# Patient Record
Sex: FEMALE | Race: WHITE | NOT HISPANIC OR LATINO | Employment: FULL TIME | URBAN - METROPOLITAN AREA
[De-identification: names, ages, dates, MRNs, and addresses within clinical notes are randomized per-mention and may not be internally consistent; named-entity substitution may affect disease eponyms.]

---

## 2023-07-27 DIAGNOSIS — L70.9 ACNE, UNSPECIFIED ACNE TYPE: ICD-10-CM

## 2023-07-27 DIAGNOSIS — B00.1 RECURRENT COLD SORES: ICD-10-CM

## 2023-07-27 RX ORDER — TRETINOIN 0.1 MG/G
GEL TOPICAL NIGHTLY
COMMUNITY
End: 2023-07-27 | Stop reason: SDUPTHER

## 2023-07-27 RX ORDER — VALACYCLOVIR HYDROCHLORIDE 1 G/1
1 TABLET, FILM COATED ORAL
COMMUNITY
Start: 2023-01-09 | End: 2023-07-27 | Stop reason: SDUPTHER

## 2023-07-28 ENCOUNTER — APPOINTMENT (OUTPATIENT)
Dept: PRIMARY CARE | Facility: CLINIC | Age: 53
End: 2023-07-28
Payer: COMMERCIAL

## 2023-07-28 RX ORDER — TRETINOIN 0.1 MG/G
GEL TOPICAL NIGHTLY
Qty: 30 G | Refills: 3 | Status: SHIPPED | OUTPATIENT
Start: 2023-07-28 | End: 2023-12-05 | Stop reason: SDUPTHER

## 2023-07-28 RX ORDER — VALACYCLOVIR HYDROCHLORIDE 1 G/1
2000 TABLET, FILM COATED ORAL 2 TIMES DAILY
Qty: 8 TABLET | Refills: 3 | Status: SHIPPED | OUTPATIENT
Start: 2023-07-28 | End: 2023-12-05 | Stop reason: ALTCHOICE

## 2023-08-18 ENCOUNTER — HOSPITAL ENCOUNTER (OUTPATIENT)
Dept: DATA CONVERSION | Facility: HOSPITAL | Age: 53
Discharge: HOME | End: 2023-08-18
Payer: COMMERCIAL

## 2023-08-18 DIAGNOSIS — M54.50 LOW BACK PAIN, UNSPECIFIED: ICD-10-CM

## 2023-08-18 DIAGNOSIS — M47.816 SPONDYLOSIS WITHOUT MYELOPATHY OR RADICULOPATHY, LUMBAR REGION: ICD-10-CM

## 2023-08-18 DIAGNOSIS — M51.36 OTHER INTERVERTEBRAL DISC DEGENERATION, LUMBAR REGION: ICD-10-CM

## 2023-08-18 DIAGNOSIS — M79.18 MYALGIA, OTHER SITE: ICD-10-CM

## 2023-09-01 ENCOUNTER — APPOINTMENT (OUTPATIENT)
Dept: PRIMARY CARE | Facility: CLINIC | Age: 53
End: 2023-09-01
Payer: COMMERCIAL

## 2023-09-11 ENCOUNTER — HOSPITAL ENCOUNTER (OUTPATIENT)
Dept: DATA CONVERSION | Facility: HOSPITAL | Age: 53
Discharge: HOME | End: 2023-09-11
Payer: COMMERCIAL

## 2023-09-11 DIAGNOSIS — M79.18 MYALGIA, OTHER SITE: ICD-10-CM

## 2023-09-11 DIAGNOSIS — M47.816 SPONDYLOSIS WITHOUT MYELOPATHY OR RADICULOPATHY, LUMBAR REGION: ICD-10-CM

## 2023-09-18 ENCOUNTER — HOSPITAL ENCOUNTER (OUTPATIENT)
Dept: DATA CONVERSION | Facility: HOSPITAL | Age: 53
Discharge: HOME | End: 2023-09-18
Payer: COMMERCIAL

## 2023-09-18 DIAGNOSIS — M70.62 TROCHANTERIC BURSITIS, LEFT HIP: ICD-10-CM

## 2023-09-27 DIAGNOSIS — E83.119 HEMOCHROMATOSIS, UNSPECIFIED HEMOCHROMATOSIS TYPE: Primary | ICD-10-CM

## 2023-10-04 PROBLEM — R79.89 LOW VITAMIN D LEVEL: Status: ACTIVE | Noted: 2023-10-04

## 2023-10-04 PROBLEM — M54.12 RIGHT CERVICAL RADICULOPATHY: Status: ACTIVE | Noted: 2023-10-04

## 2023-10-04 PROBLEM — G57.30: Status: ACTIVE | Noted: 2023-10-04

## 2023-10-04 PROBLEM — H91.93 BILATERAL HEARING LOSS: Status: ACTIVE | Noted: 2023-10-04

## 2023-10-04 PROBLEM — M51.36 BULGING OF INTERVERTEBRAL DISC BETWEEN L4 AND L5: Status: ACTIVE | Noted: 2023-10-04

## 2023-10-04 PROBLEM — M54.10 RADICULOPATHY AFFECTING UPPER EXTREMITY: Status: ACTIVE | Noted: 2023-10-04

## 2023-10-04 PROBLEM — G57.02 PIRIFORMIS SYNDROME OF LEFT SIDE: Status: ACTIVE | Noted: 2023-10-04

## 2023-10-04 PROBLEM — M72.2 PLANTAR FASCIITIS, LEFT: Status: ACTIVE | Noted: 2023-10-04

## 2023-10-04 PROBLEM — J32.9 CHRONIC SINUSITIS: Status: ACTIVE | Noted: 2023-10-04

## 2023-10-04 PROBLEM — M46.42 CERVICAL DISCITIS: Status: ACTIVE | Noted: 2023-10-04

## 2023-10-04 PROBLEM — G95.89 CERVICAL SPINAL MASS (MULTI): Status: ACTIVE | Noted: 2023-10-04

## 2023-10-04 PROBLEM — B00.1 RECURRENT COLD SORES: Status: ACTIVE | Noted: 2023-10-04

## 2023-10-04 PROBLEM — M50.30 DEGENERATIVE DISC DISEASE, CERVICAL: Status: ACTIVE | Noted: 2023-10-04

## 2023-10-04 PROBLEM — M54.2 CERVICALGIA: Status: ACTIVE | Noted: 2023-10-04

## 2023-10-04 PROBLEM — Q66.6 VALGUS DEFORMITY OF BOTH FEET: Status: ACTIVE | Noted: 2023-10-04

## 2023-10-04 PROBLEM — R22.1 MASS OF RIGHT SIDE OF NECK: Status: ACTIVE | Noted: 2023-10-04

## 2023-10-04 PROBLEM — M79.18 LEFT BUTTOCK PAIN: Status: ACTIVE | Noted: 2023-10-04

## 2023-10-04 PROBLEM — M21.70 LEG LENGTH DISCREPANCY: Status: ACTIVE | Noted: 2023-10-04

## 2023-10-04 PROBLEM — M41.87: Status: ACTIVE | Noted: 2023-10-04

## 2023-10-04 PROBLEM — M51.369 BULGING OF INTERVERTEBRAL DISC BETWEEN L4 AND L5: Status: ACTIVE | Noted: 2023-10-04

## 2023-10-04 PROBLEM — S76.012A MUSCLE STRAIN OF LEFT GLUTEAL REGION: Status: ACTIVE | Noted: 2023-10-04

## 2023-10-04 PROBLEM — R51.9 SINUS HEADACHE: Status: ACTIVE | Noted: 2023-10-04

## 2023-10-04 PROBLEM — N39.3 STRESS INCONTINENCE IN FEMALE: Status: ACTIVE | Noted: 2023-10-04

## 2023-10-04 PROBLEM — G43.909 MIGRAINE: Status: ACTIVE | Noted: 2023-10-04

## 2023-10-04 PROBLEM — M47.816 SPONDYLOSIS OF LUMBAR SPINE: Status: ACTIVE | Noted: 2023-10-04

## 2023-10-04 PROBLEM — F40.240 CLAUSTROPHOBIA: Status: ACTIVE | Noted: 2023-10-04

## 2023-10-04 PROBLEM — S76.312A LEFT HAMSTRING MUSCLE STRAIN: Status: ACTIVE | Noted: 2023-10-04

## 2023-10-04 RX ORDER — CLINDAMYCIN PHOSPHATE 10 UG/ML
LOTION TOPICAL 2 TIMES DAILY
Status: ON HOLD | COMMUNITY
Start: 2023-08-08 | End: 2023-12-12 | Stop reason: ALTCHOICE

## 2023-10-04 RX ORDER — AZELASTINE 1 MG/ML
2 SPRAY, METERED NASAL EVERY EVENING
COMMUNITY
Start: 2023-07-27 | End: 2023-12-05 | Stop reason: ALTCHOICE

## 2023-10-04 RX ORDER — CYCLOBENZAPRINE HCL 10 MG
10 TABLET ORAL NIGHTLY
COMMUNITY

## 2023-10-04 RX ORDER — IBUPROFEN 800 MG/1
800 TABLET ORAL EVERY 8 HOURS PRN
COMMUNITY
Start: 2022-05-17

## 2023-10-05 ENCOUNTER — OFFICE VISIT (OUTPATIENT)
Dept: UROLOGY | Facility: CLINIC | Age: 53
End: 2023-10-05
Payer: COMMERCIAL

## 2023-10-05 VITALS — DIASTOLIC BLOOD PRESSURE: 76 MMHG | SYSTOLIC BLOOD PRESSURE: 120 MMHG

## 2023-10-05 DIAGNOSIS — N39.3 STRESS INCONTINENCE IN FEMALE: Primary | ICD-10-CM

## 2023-10-05 PROCEDURE — 1036F TOBACCO NON-USER: CPT | Performed by: OBSTETRICS & GYNECOLOGY

## 2023-10-05 PROCEDURE — 99213 OFFICE O/P EST LOW 20 MIN: CPT | Performed by: OBSTETRICS & GYNECOLOGY

## 2023-10-05 NOTE — PROGRESS NOTES
Subjective   Patient ID: Angeles Valdez is a 52 y.o. female who presents for  incontinence ring pessary fitting       52-year-old with stress urinary incontinence for incontinence ring pessary fitting     She has no new complaints.      From Previous note  52 - year-old patient presenting as a referral from  with complaints of stress urinary incontinence and pelvic discomfort on intercourse.     The patient presents with complaints stress urinary urinary incontinence. She complaints of episodes of incontinence on movement and walking. She does note leaking with laughing, coughing and sneezing. She denies any episodes of nocturia or enuresis. She denies any daytime urgency or frequency. She denies any History of nephrolithiasis, gross hematuria or chronic recurrent UTIs.     She is sexually active and notes discomfort with deep penetration, she denies any abnormal vaginal bleeding or discharge. She denies any vaginal dryness or irritation. She denies any bulge complaints, no pressure or pulling.      She denies any bowel related complaints, no fecal or flatal incontinence.     She has no other complaints  Review of Systems  Constitutional: No fever, No chills and No fatigue.   Eyes: No vision problems and No dryness of the eyes.   ENT: No dry mouth, No hearing loss and No nosebleeds.   Cardiovascular: No chest pain, No palpitations and No orthopnea.   Respiratory: No shortness of breath, No cough and No wheezing.   Gastrointestinal: No abdominal pain, No constipation, No nausea, No diarrhea, No vomiting and No melena.   Genitourinary: As noted in HPI.   Musculoskeletal: No back pain, No myalgias, No muscle weakness, No joint swelling and No leg edema.   Integumentary: No rashes, No skin lesion and No itching.   Neurological: No headache, No numbness and No dizziness.   Psychiatric: No sleep disturbances, No anxiety and No depression.   Endocrine: No hot flashes, No loss of hair and No hirsutism.    Hematologic/Lymphatic: No swollen glands, No tendency for easy bleeding and No tendency for easy bruising.   All other systems have been reviewed and are negative for complaint.        Objective   Physical Exam     Constitutional: alert and in no acute distress, well developed, well nourished.   Head and Face: head and face: unremarkable.   Eyes, Ears, Nose, Mouth, and Throat: unremarkable external exam - nonicteric sclera, extraocular movements intact (EOMI) and no ptosis, unremarkable external inspection of ears and nose.   Neck: no neck asymmetry, supple, thyroid not enlarged and there were no palpable thyroid nodules.   Cardiovascular: unremarkable distal pulses.   Pulmonary: no respiratory distress, unremarkable respiratory effort., clear bilateral breath sounds.   Abdomen: soft nontender; no abdominal mass palpated, no organomegaly and no hernias.   Musculoskeletal: no joint swelling seen, normal movements of all extremities.   Skin: normal skin color and pigmentation, normal skin turgor, and no rash.   Neurologic: cranial nerves: non-focal, grossly intact.   Lymphatic: no cervical lymphadenopathy, palpation of lymph nodes in other areas: normal.   Psychiatric: alert and oriented x 3, affect normal to patient baseline, mood: appropriate and judgment and insight: intact.      Sexual Maturity: Normal.   External Genitalia: No vulvar atrophy and No vulvitis.   Inguinal Nodes: No inguinal lymph adenopathy.   Vestibule: There is an 8 mm sebaceous cyst on the lower portion of the labia majora on the left. This is nonerythematous or tender., but No Bartholin's Cyst and No Grenora's Gland Cyst.   Urethra: hypermobile, but No urethrocele, No urethral caruncle, No mucosal prolapse and No tenderness.   Negative CST when sitting.   Bladder: No Tenderness and No distention.   Vagina: No cystocele, No rectocele, No uterine prolapse and No atrophy.   Cervix: IUD strings at cervical os, but Unremarkable and No cervical motion  tenderness.   Uterus: mobile, but No tenderness.   Adnexa: Unremarkable on palpation.   Pelvic Floor/Tenderness: KEGEL (Out of 5): 4. highest pain level is 0 (0-10).   POP-Q: Stage: 1 and patient was sitting. Aa: -2. Ba: -2 C: -7 Gh: 3. Pb: 4 TVL: 9 Ap: -3. Bp: -3. D: -8.   Rectum: was not prolapsed.   Anus: There were no anal fissures. There is no hemorrhoids.   Perianal area: Unremarkable.     #4 incontinence ring with knob was placed without difficulty.  The patient was taught how to remove and replace the device herself.      Assessment/Plan      52-year-old with stress urinary incontinence for #4 incontinence ring pessary fitting.     1.  Uncomplicated fitting for #4 incontinence ring pessary today 10/5/2023.  We discussed at length today the patient's stress urinary incontinence complaints. She has good pelvic floor strength and no weakness and no pain. We discussed expectant management, pelvic floor physical therapy, pessary management, and surgical options including mid urethral sling and injectable therapy. She is also interested in pursuing injectable Bulkamid therapy thereafter if this is unsuccessful.     2. The patient will follow-up in 1 to 2 weeks to discuss her incontinence ring pessary.     REMIGIO Hernandez MD    Scribe Attestation  By signing my name below, IKeena Scribe attest that this documentation has been prepared under the direction and in the presence of Thomas Hernandez MD. All medical record entries made by the Scribe were at my direction or personally dictated by me. I have reviewed the chart and agree that the record accurately reflects my personal performance of the history, physical exam, discussion and plan.

## 2023-10-17 ENCOUNTER — APPOINTMENT (OUTPATIENT)
Dept: AUDIOLOGY | Facility: CLINIC | Age: 53
End: 2023-10-17
Payer: COMMERCIAL

## 2023-10-20 ENCOUNTER — APPOINTMENT (OUTPATIENT)
Dept: UROLOGY | Facility: CLINIC | Age: 53
End: 2023-10-20
Payer: COMMERCIAL

## 2023-10-26 ENCOUNTER — APPOINTMENT (OUTPATIENT)
Dept: PRIMARY CARE | Facility: CLINIC | Age: 53
End: 2023-10-26
Payer: COMMERCIAL

## 2023-10-29 NOTE — PROGRESS NOTES
Subjective   Patient ID: Angeles Valdez is a 53 y.o. female who presents for follow up.    HPI  53-year-old with stress urinary incontinence presenting following #4 incontinence ring pessary fitting    The patient presents with the pessary expulsed when she went running. She took out the pessary and replaced it after which expulsed. The pessary was uncomfortable while it was in place.    She denies any vaginal complaints, no abnormal bleeding or discharge.     She denies any bowel related complaints, no fecal or flatal incontinence.    She has no other complaints.      From Previous note   52-year-old with stress urinary incontinence for incontinence ring pessary fitting      She has no new complaints.        From Previous note  52 - year-old patient presenting as a referral from  with complaints of stress urinary incontinence and pelvic discomfort on intercourse.     The patient presents with complaints stress urinary urinary incontinence. She complaints of episodes of incontinence on movement and walking. She does note leaking with laughing, coughing and sneezing. She denies any episodes of nocturia or enuresis. She denies any daytime urgency or frequency. She denies any History of nephrolithiasis, gross hematuria or chronic recurrent UTIs.     She is sexually active and notes discomfort with deep penetration, she denies any abnormal vaginal bleeding or discharge. She denies any vaginal dryness or irritation. She denies any bulge complaints, no pressure or pulling.      She denies any bowel related complaints, no fecal or flatal incontinence.     She has no other complaints    Review of Systems  Constitutional: No fever, No chills and No fatigue.   Eyes: No vision problems and No dryness of the eyes.   ENT: No dry mouth, No hearing loss and No nosebleeds.   Cardiovascular: No chest pain, No palpitations and No orthopnea.   Respiratory: No shortness of breath, No cough and No wheezing.   Gastrointestinal: No  abdominal pain, No constipation, No nausea, No diarrhea, No vomiting and No melena.   Genitourinary: As noted in HPI.   Musculoskeletal: No back pain, No myalgias, No muscle weakness, No joint swelling and No leg edema.   Integumentary: No rashes, No skin lesion and No itching.   Neurological: No headache, No numbness and No dizziness.   Psychiatric: No sleep disturbances, No anxiety and No depression.   Endocrine: No hot flashes, No loss of hair and No hirsutism.   Hematologic/Lymphatic: No swollen glands, No tendency for easy bleeding and No tendency for easy bruising.   All other systems have been reviewed and are negative for complaint.        Objective   Physical Exam    PHYSICAL EXAMINATION:  No LMP recorded.  There is no height or weight on file to calculate BMI.  /73   Pulse 70   General Appearance: well appearing  Neuro: Alert and oriented   HEENT: mucous membranes moist, neck supple  Resp: No respiratory distress, normal work of breathing  MSK: normal range of motion, gait appropriate      Assessment/Plan     52-year-old with stress urinary incontinence having failed incontinence ring presenting to discuss surgical options.     1.  The patient had no success with an incontinence ring which was uncomfortable and was expulsed.  She has good pelvic floor strength and no weakness and no pain. We again discussed expectant management, pelvic floor physical therapy, pessary management, and surgical options including mid urethral sling and injectable therapy.  We discussed at length the various risk of these procedures including bleeding, infection, damage surrounding tissues, postoperative restrictions, and failure.  Patient wishes to proceed with Bulkamid therapy and will be scheduled at her earliest convenience.     2. The patient will be scheduled for Bulkamid therapy at her earliest convenience.     REMIGIO Hernandez MD     Scribe Attestation  By signing my name below, Keena RAINES Scribe  attest that this documentation has been prepared under the direction and in the presence of Thomas Hernandez MD. All medical record entries made by the Scribe were at my direction or personally dictated by me. I have reviewed the chart and agree that the record accurately reflects my personal performance of the history, physical exam, discussion and plan.

## 2023-10-31 ENCOUNTER — OFFICE VISIT (OUTPATIENT)
Dept: PRIMARY CARE | Facility: CLINIC | Age: 53
End: 2023-10-31
Payer: COMMERCIAL

## 2023-10-31 VITALS
HEIGHT: 65 IN | BODY MASS INDEX: 21.33 KG/M2 | OXYGEN SATURATION: 98 % | DIASTOLIC BLOOD PRESSURE: 75 MMHG | HEART RATE: 70 BPM | SYSTOLIC BLOOD PRESSURE: 121 MMHG | WEIGHT: 128 LBS

## 2023-10-31 DIAGNOSIS — Z00.00 PREVENTATIVE HEALTH CARE: Primary | ICD-10-CM

## 2023-10-31 DIAGNOSIS — R79.89 ABNORMAL CBC MEASUREMENT: ICD-10-CM

## 2023-10-31 DIAGNOSIS — M35.9 DIFFUSE CONNECTIVE TISSUE DISEASE (MULTI): ICD-10-CM

## 2023-10-31 DIAGNOSIS — E83.118 OTHER HEMOCHROMATOSIS: ICD-10-CM

## 2023-10-31 DIAGNOSIS — L70.0 ACNE VULGARIS: ICD-10-CM

## 2023-10-31 DIAGNOSIS — G95.89 CERVICAL SPINAL MASS (MULTI): ICD-10-CM

## 2023-10-31 PROCEDURE — 90686 IIV4 VACC NO PRSV 0.5 ML IM: CPT | Performed by: FAMILY MEDICINE

## 2023-10-31 PROCEDURE — 1036F TOBACCO NON-USER: CPT | Performed by: FAMILY MEDICINE

## 2023-10-31 PROCEDURE — 99396 PREV VISIT EST AGE 40-64: CPT | Performed by: FAMILY MEDICINE

## 2023-10-31 PROCEDURE — 90471 IMMUNIZATION ADMIN: CPT | Performed by: FAMILY MEDICINE

## 2023-10-31 PROCEDURE — 99213 OFFICE O/P EST LOW 20 MIN: CPT | Performed by: FAMILY MEDICINE

## 2023-10-31 RX ORDER — TRETINOIN 1 MG/G
CREAM TOPICAL NIGHTLY
Qty: 45 G | Refills: 1 | Status: SHIPPED | OUTPATIENT
Start: 2023-10-31 | End: 2024-10-30

## 2023-10-31 NOTE — PATIENT INSTRUCTIONS
Labs were reviewed from 7/13/23 , repeat yearly.      Referral for uro gynecology assessment - Follow up with Dr. Hernandez.     BP looks good today at 121/75     Refilled the valtrex    Refilled the tretinoin cream     Mammo done on 9/28/23 was Negative. Recommendation: Routine Screening Mammogram in 1 Year      colonoscopy was good on 2/27/23- repeat in 10 years or sooner as needed     Deep peroneal nerve irritation on the right, and left lumbar radiation on the left- continue to monitor and follow up with Dr Jesús pal from [facility] for botox shots of the base of the head for migraines and neck pain / headaches.      Flu shot today.     follow up in 12 months for yearly follow up or sooner as needed.

## 2023-10-31 NOTE — PROGRESS NOTES
"Subjective   Patient is a 53 y.o. female presents for yearly physical examination.     19 yr old daughter - at [facility]   started progesterone for post menopausal symptoms - not great at daily meds      #) left SI pain - to get MRI   - tennis, runs, bike, lifting   - following with Sports med      #) BP is 122/60      perimenopausal   - no hotflashes   - sleep is ok  - lower sex drive  - slight weight gain and brain fog      Review of system are negative unless otherwise stated in the HPI.     Objective     /75   Pulse 70   Ht 1.651 m (5' 5\")   Wt 58.1 kg (128 lb)   SpO2 98%   BMI 21.30 kg/m²     Physical Examination  GEN: A+O, no acute distress  HEENT: NC/AT, Oropharynx clear, no exudates, TM visualized, Extraoccular muscles intact, no facial droop; no thyromegaly or cervical LAD  RESP: CTAB, no wheezes   CV: RRR, no murmurs  ABD: soft, non-tender, + BS  SKIN: no rashes or bruising, no peripheral edema   NEURO: CN II-XII grossly intact, moves all extremities equally, no tremor   PSYCH: normal affect, appropriate mood     Assessment/Plan     Active Problems:  There are no active Hospital Problems.    Labs were reviewed from 7/13/23 , repeat yearly.      Referral for uro gynecology assessment - Follow up with Dr. Hernandez.     BP looks good today at 121/75     Refilled the valtrex    Refilled the tretinoin cream     Mammo done on 9/28/23 was Negative. Recommendation: Routine Screening Mammogram in 1 Year      colonoscopy was good on 2/27/23- repeat in 10 years or sooner as needed     Deep peroneal nerve irritation on the right, and left lumbar radiation on the left- continue to monitor and follow up with Dr Jesús pal from [facility] for botox shots of the base of the head for migraines and neck pain / headaches.      Flu shot today.     follow up in 12 months for yearly follow up or sooner as needed.   "

## 2023-11-02 ENCOUNTER — OFFICE VISIT (OUTPATIENT)
Dept: UROLOGY | Facility: CLINIC | Age: 53
End: 2023-11-02
Payer: COMMERCIAL

## 2023-11-02 VITALS — HEART RATE: 70 BPM | SYSTOLIC BLOOD PRESSURE: 106 MMHG | DIASTOLIC BLOOD PRESSURE: 73 MMHG

## 2023-11-02 DIAGNOSIS — N39.3 STRESS INCONTINENCE IN FEMALE: Primary | ICD-10-CM

## 2023-11-02 PROCEDURE — 99213 OFFICE O/P EST LOW 20 MIN: CPT | Performed by: OBSTETRICS & GYNECOLOGY

## 2023-11-02 PROCEDURE — 1036F TOBACCO NON-USER: CPT | Performed by: OBSTETRICS & GYNECOLOGY

## 2023-11-02 RX ORDER — SODIUM CHLORIDE, SODIUM LACTATE, POTASSIUM CHLORIDE, CALCIUM CHLORIDE 600; 310; 30; 20 MG/100ML; MG/100ML; MG/100ML; MG/100ML
100 INJECTION, SOLUTION INTRAVENOUS CONTINUOUS
Status: CANCELLED | OUTPATIENT
Start: 2023-11-02

## 2023-11-02 NOTE — PATIENT INSTRUCTIONS
You will be contacted for scheduling to proceed with Bulkamid at the [facility].    Please contact the clinic with any further questions or concerns at [phone].

## 2023-11-07 ENCOUNTER — CLINICAL SUPPORT (OUTPATIENT)
Dept: AUDIOLOGY | Facility: CLINIC | Age: 53
End: 2023-11-07
Payer: COMMERCIAL

## 2023-11-07 DIAGNOSIS — Z01.10 ENCOUNTER FOR HEARING EXAMINATION WITHOUT ABNORMAL FINDINGS: Primary | ICD-10-CM

## 2023-11-07 PROCEDURE — 92550 TYMPANOMETRY & REFLEX THRESH: CPT | Performed by: AUDIOLOGIST

## 2023-11-07 PROCEDURE — 92557 COMPREHENSIVE HEARING TEST: CPT | Performed by: AUDIOLOGIST

## 2023-11-07 ASSESSMENT — PAIN - FUNCTIONAL ASSESSMENT: PAIN_FUNCTIONAL_ASSESSMENT: 0-10

## 2023-11-07 ASSESSMENT — PAIN SCALES - GENERAL: PAINLEVEL_OUTOF10: 0 - NO PAIN

## 2023-11-21 ENCOUNTER — HOSPITAL ENCOUNTER (OUTPATIENT)
Dept: RADIOLOGY | Facility: HOSPITAL | Age: 53
Discharge: HOME | End: 2023-11-21
Payer: COMMERCIAL

## 2023-11-21 ENCOUNTER — APPOINTMENT (OUTPATIENT)
Dept: PREADMISSION TESTING | Facility: HOSPITAL | Age: 53
End: 2023-11-21
Payer: COMMERCIAL

## 2023-11-21 DIAGNOSIS — G57.02 LESION OF SCIATIC NERVE, LEFT LOWER LIMB: ICD-10-CM

## 2023-11-21 DIAGNOSIS — M70.62 TROCHANTERIC BURSITIS, LEFT HIP: ICD-10-CM

## 2023-11-21 DIAGNOSIS — S73.192D OTHER SPRAIN OF LEFT HIP, SUBSEQUENT ENCOUNTER: ICD-10-CM

## 2023-11-21 PROCEDURE — A9575 INJ GADOTERATE MEGLUMI 0.1ML: HCPCS | Performed by: FAMILY MEDICINE

## 2023-11-21 PROCEDURE — 73722 MRI JOINT OF LWR EXTR W/DYE: CPT | Mod: LT

## 2023-11-21 PROCEDURE — 73722 MRI JOINT OF LWR EXTR W/DYE: CPT | Mod: LEFT SIDE | Performed by: STUDENT IN AN ORGANIZED HEALTH CARE EDUCATION/TRAINING PROGRAM

## 2023-11-21 PROCEDURE — 2550000001 HC RX 255 CONTRASTS: Performed by: FAMILY MEDICINE

## 2023-11-21 PROCEDURE — 27093 INJECTION FOR HIP X-RAY: CPT | Mod: LEFT SIDE | Performed by: STUDENT IN AN ORGANIZED HEALTH CARE EDUCATION/TRAINING PROGRAM

## 2023-11-21 PROCEDURE — 77002 NEEDLE LOCALIZATION BY XRAY: CPT | Mod: LEFT SIDE | Performed by: STUDENT IN AN ORGANIZED HEALTH CARE EDUCATION/TRAINING PROGRAM

## 2023-11-21 PROCEDURE — 73525 CONTRAST X-RAY OF HIP: CPT | Mod: LT

## 2023-11-21 RX ORDER — GADOTERATE MEGLUMINE 376.9 MG/ML
0.2 INJECTION INTRAVENOUS
Status: COMPLETED | OUTPATIENT
Start: 2023-11-21 | End: 2023-11-21

## 2023-11-21 RX ORDER — SODIUM CHLORIDE 0.9 % (FLUSH) 0.9 %
10 SYRINGE (ML) INJECTION EVERY 8 HOURS SCHEDULED
Status: DISCONTINUED | OUTPATIENT
Start: 2023-11-21 | End: 2023-11-22 | Stop reason: HOSPADM

## 2023-11-21 RX ORDER — LIDOCAINE HYDROCHLORIDE 10 MG/ML
20 INJECTION, SOLUTION EPIDURAL; INFILTRATION; INTRACAUDAL; PERINEURAL ONCE
Status: DISCONTINUED | OUTPATIENT
Start: 2023-11-21 | End: 2023-11-22 | Stop reason: HOSPADM

## 2023-11-21 RX ADMIN — GADOTERATE MEGLUMINE 0.1 ML: 376.9 INJECTION INTRAVENOUS at 12:08

## 2023-11-21 RX ADMIN — IOHEXOL 10 ML: 240 INJECTION, SOLUTION INTRATHECAL; INTRAVASCULAR; INTRAVENOUS; ORAL at 12:03

## 2023-11-28 PROBLEM — M62.9 HAMSTRING TIGHTNESS OF BOTH LOWER EXTREMITIES: Status: ACTIVE | Noted: 2023-11-28

## 2023-11-28 PROBLEM — M25.552 PAIN OF LEFT HIP: Status: ACTIVE | Noted: 2023-11-28

## 2023-11-28 PROBLEM — M24.159 DEGENERATIVE TEAR OF ACETABULAR LABRUM: Status: ACTIVE | Noted: 2023-11-28

## 2023-11-28 PROBLEM — M54.42 LOW BACK PAIN WITH LEFT-SIDED SCIATICA: Status: ACTIVE | Noted: 2023-11-28

## 2023-11-28 PROBLEM — M24.559 HIP FLEXOR TIGHTNESS: Status: ACTIVE | Noted: 2023-11-28

## 2023-11-28 PROBLEM — M99.04 SACRAL REGION SOMATIC DYSFUNCTION: Status: ACTIVE | Noted: 2023-11-28

## 2023-11-28 PROBLEM — M16.12 PRIMARY OSTEOARTHRITIS OF LEFT HIP: Status: ACTIVE | Noted: 2023-11-28

## 2023-11-28 NOTE — PROGRESS NOTES
"Verbal consent of the patient and/or verbal parental consent for patients under the age of 18 have been obtained to conduct a physical examination at this office visit.    Established patient  History Of Present Illness  12/5/23 Angeles Valdez is a 53 y.o. female who presents to review her LEFT hip MR Arthrogram. She continues to perform her home exercises previously learned in Physical Therapy. She describes her left hip pain as an aching, burning sensation that initiates in the left side of her low back and extends into her left IT band. She reports over the weekend she was running and felt pain diffusely throughout her left knee citing it felt as though it has \"seized\" up on her, but she kept running through the pain. She rates her pain at 2/10 today with mild improvement overall. She takes OTC Ibuprofen for pain management as needed with mild, temporary relief. Additionally, she takes OTC curcumin and Move Free as previously recommended for joint health and wears recommended supportive footwear. She states that going up and down stairs causes her pain. After she was done running this weekend she experienced numbness and tingling going down her leg.  We talked in detail about her MR arthrogram results and that it is definitely possibility that the labral tear is affecting some of the stuff in her hip but also she has some gluteal tendinosis that is interacting with her proximal portion of her tensor fascia monika and is also referring stuff down her leg as well as probably from her lumbar spine with her disc bulges and protrusions.        Last Recorded Vitals  /72   Pulse 68   Ht 1.651 m (5' 5\")   Wt 58.1 kg (128 lb)   BMI 21.30 kg/m²      All previous Progress Notes and imaging results related to this patients chief complaint have been reviewed in preparation for this examination.    Past Medical History  She has no past medical history on file.    Surgical History  She has no past surgical history on " file.     Social History  She reports that she has quit smoking. Her smoking use included cigarettes. She has never used smokeless tobacco. She reports that she does not drink alcohol and does not use drugs.    Family History  No family history on file.     Allergies  Patient has no known allergies.    Historical Clinical Intake  April 28, 2022--- Verbal consent of the patient and/or verbal parental consent for patients under the age of 18 have been obtained to conduct a physical examination at this office visit. This 51 year old female presents today for evaluation of left buttock/leg pain. She was injured approximately 2 months ago while playing tennis. She was sprinting to the net and she felt a pop in the posterior aspect of left buttock and thigh and will radiate down her left leg into her foot. Denies any numbness/tingling. Denies any pain with walking, standing, laying down. Pain is worse with sitting. She has been using ice, rest, moist heat, stretching with no relief in symptoms. She has been using OTC Tylenol and NSAIDS with no relief in pain. She has been using a cervical pillow to sit on and has been using a tennis ball to apply pressure to her left pirformis/buttock. She is flying to [place] tomorrow and is hoping for a possible cortisone injection. Pain is currently 8/10 with sititng and is burning in nature.  ---------------------  May 17, 2022 Above note reviewed. Verbal consent of the patient and/or verbal parental consent for patients under the age of 18 have been obtained to conduct a physical examination at this office visit. Angeles is here for a reevaluation and to review the MRI of her PELVIS. She states that she did get mild relief of symptoms while of Prednisone, but relief was short-lived. She continues to c/o pain LEFT proximal hamstring at insertion especially when sitting. She continues to have to put a golf ball under this area when she is sitting it seems to help alleviate the pain.  She also has multiple trigger points around that hamstring area and in her gluteal area. She rates pain currently at 4/10, but it can get up to 8/10 after prolonged sitting. She is not currently in PT as she has been out of the state traveling, but is wearing her insoles and shoes with good support as directed previously. In addition, she is taking Flexiril as needed for pain.  -----------------------  January 3, 2023 Above note reviewed. Verbal consent of the patient and/or verbal parental consent for patients under the age of 18 have been obtained to conduct a physical examination at this office visit. Angeles, an established patient, is here for a reevaluation of her left hip/leg/buttock pain. Angeles did not complete physical therapy for her injury. She states she is experiencing increased pain. She states it is difficult to sit for an extended amount of time. She says her pain is is present in her left lateral hip and buttock and is radiating down her leg all the way to her ankle. Angeles describes her pain as aching and burning. She states she has been using Tylenol and OTC NSAIDs with no relief. She rates her pain today as 4/10.  -----------------------  September 18, 2023 Above note reviewed. Verbal consent of the patient and/or verbal parental consent for patients under the age of 18 have been obtained to conduct a physical examination at this office visit. Angeles is an established patient who presents today for a reevaluation of her LUMBAR spine and review of her MRI. She states that overall she is feeling significantly better but continues to get soreness in LEFT posterior hip that is alleviated by putting pressure there. She denies any numnbess/tingling in either leg. She states that she will run 30 miles per week. Pain is currently 1/10 at rest. She additionally complains of some pain that gets worse in her posterior hip. She says it feels different than the pain that shoots down her leg off and on.  She says that it only hurts after running for long periods of time. We talked in detail about crosstraining and switching stuff up as she gets older with different exercises and activities. We also talked in detail about regenerative injections once again and she definitely is considering regenerative injections in the hip because she would like to try to avoid surgical intervention.    Review of Systems  Review of Systems:  CONSTITUTIONAL:   Negative for weight change, loss of appetite, fatigue, weakness, fever, chills, night sweats, headaches .           HEENT:   Negative for cold, cough, sore throat, sinus pain, swollen lymph nodes.           OPHTHALMOLOGY:   Negative for diminished vision, blurred vision, loss of vision, double vision.           ALLERGY:   Negative for runny nose, scratchy throat, sinus congestion, rash, facial pressure, nasal congestion, post-nasal drip.           CARDIOLOGY:   Negative for chest pain, palpitations, murmurs, irregular heart beat, shortness of breath, leg edema, dyspnea on exertion, fatigue, dizziness.           RESPIRATORY:   Negative for chest pain, shortness of breath, swelling of the legs, asthma/copd, chest congestion, pain with breathing .           GASTROENTEROLOGY:   Negative for nausea, vomitting, heartburn, constipation, diarrhea, blood in stool, change in bowel habits, black stool.           HEMATOLOGY/LYMPH:   Negative for fatigue, loss of appetitie, easy bruising, easy bleeding, anemia, abnormal bleeding, slow healing.           ENDOCRINOLOGY:   Negative for polyuria, polydipsia, polyphagia, fatigue, weight loss, weight gain, cold intolerance, heat intolerance, diabetes.           MUSCULOSKELETAL:   Positive  for LEFT hip pain         DERMATOLOGY:   Negative for rash, bruising.           NEUROLOGY:   Negative for tingling, numbness, gait abnormality, paresthesias, weakness, sciatica.         General Examination:  GENERAL APPEARANCE: Appears well, pleasant, and  cooperative, NAD.   HEENT: Normal, unremarkable.   NECK: Supple.   HEART: RRR, normal S1S2.   LUNGS: Clear to auscultation bilaterally.   ABDOMEN: Soft, NT/ND, BS present.   EXTREMITIES: No cyanosis, clubbing.   SKIN: No rash or cellulitis.   NEUROLOGIC EXAM: Awake, A&O x 3, CN's II-XII grossly intact.   PSYCH: Good eye contact, appropriate mood and affect        The scribe, Stephanie, was present in the room during the entire visit including, but not limited to the physical examination!.    General (SPINE):  Location:  LUMBAR.   Erythema:  Negative.   Edema:  Negative.   Effusion:  Negative.   TART Findings: POSITIVE:,   Tissue Texture Changes, Asymmetry, Restriction, Tenderness.  Lower lumbar spine on the left  Warmth:  Negative.   Ecchymosis/Bruising:  Negative.   Percussion Test (LUMBAR):  Negative.   Tuning Fork Test (LUMBAR):  Negative.   Percussion (Sacrum):  Negative.   Tuning Fork (Sacrum):  Negative.   Abrasions:  Negative.   Orientation (LUMBAR):  Symmetrical.   Orientation (Sacrum):  Negative.   ROM (LUMBAR):  FROM: Forward Flexion, Extension, Lateral Bending (Side Bending), and Twisting (Rotation).   ROM (Sacrum):  FROM: Sacral Flexion and Sacral Extension.     Muscle Strength:  POSITIVE:, Improved  +4-+5+5 Hamstring Flexion  +5/+5 Quadricep Extension  +5/+5 Hip Flexion  +4-+5+5 Hip Extension  +4-+5/+5 Hip ABduction toward body  +5/+5 Hip ADduction away from body  +5/+5 Hip Internal Rotation at 90 Degrees  +5/+5 Hip External Rotation at 90 Degrees  +5/+5 Hip Internal Rotation at 0 Degrees  +5/+5 Hip External Rotation at 0 Degrees.             DTR/Neurological: +2/+4 Patellar Reflex (L-4)  +2/+4 Posterior Tibialis and Medial Hamstrings Reflex (L5)  +2/+4 Achilles Reflex (S-1).     Sensation/Neurological Lumbar: Negative, Sensation Intact, 2-Point Discrimination Negative  L1: Low back, hips, and groin  L2: Low back and front of inside of upper leg/thigh  L3: Low back and front of upper leg/thigh  L4: Low  back, front of upper leg/thigh, front of lower leg/calf, front of medial area of knee, and inside of ankle  L5: Low back, front and lateral knee, front and outside of lower leg/calf, top and bottom of foot and first four toes especially big toe.     Sensation/Neurological Sacrum: Negative, Sensation Intact, 2 -Point Discrimination Test: Negative  S1: low back, back of upper leg/thigh, back and inside of lower leg, calf and little toe  S2: Buttocks, genitals, back of upper leg/thigh and calves  S3: Buttocks and genitals  S4: Buttocks  S5: Buttocks.     Sensation/Neurological Coccygeal: Negative, Sensation Intact, 2-Point Discrimination: Negative  Coccyx: Buttocks and area of tailbone.     Palpation: Positive, Tenderness to Palpation LEFT PROXIMAL IT band throughout, greater trochanter, common hamstring tendon and hip flexor      Vascular: Capillary Refill < 2 seconds  +2/+4Carotid  +2/+4 Dorsalis Pedis  +2/+4 Posterior Tibial.     Feet/Foot BILATERAL Valgus Foot.                      Low Back-Disc Injury: All findings improved somewhat since last visit  Valsalva Maneuver: Negative .   Lhermitte's Sign: Negative .   Fermoral Nerve Traction Test:  Positive,  LEFT,  Slump Test:  Positive:  LEFT,  Cross Test Seated: Negative .   Seated Straight Leg Raise:  Positive:  LEFT,  Lying Straight Leg Raise Test:  Positive:   LEFT.   Laseague Sign: Negative .   Laseague Differential Test: Negative.   Laseague Drop Test: Negative .   Seated Laseague Test: Negative .   Reverse Laseague Test: Negative .   Bonnet Piriformis Test: Positive:     Bragard Test: Negative .   Duchenne Trendelenberg Test: Negative .   Kernig-Brudzinski Test: Negative .   Tip Toe Heel Walking Test: Negative .   Carina Prone Knee Flexion Test: Negative .       Low Back-Hip:All findings improved somewhat since last visit  Juan David/CARYN Test: Positive:   FADIR Test:  Positive:   Iliolumbar Ligament Test: Negative.   Sacrospinous and SI Ligament Test: Negative  .   Sacrotuberal Ligament Test: Negative .   Psoas Sign: Positive:       Low Back-Sciatica:  Wilson Test: Negative.     Low Back-SI Joint:  Three-Phase Hyperextension Test: Negative .   Spine Test: Negative .   Yeoman Test: Negative .   Josie Test: Negative .   Sacroilliac Stress Test: Negative.   Abduction Stress Test: Negative .     Low Back-Spondy:All findings improved somewhat since last visit  Stork Test: Positive:    Sphinx Test: Positive:    Modified Sphinx Test: Positive:       Hip/Pelvis - Sacrum:  Leg Length Supine: Positive: ,  LEFT shorter than Right .   Leg Length Supine to Seated (Derbolowsky Sign):  Positive:  Standing Flexion Test:  Positive: LEFT .   Seated Flexion Test:  Positive: , LEFT.   Spring Test: Negative .   Sacral Somatic Dysfunction: Positive: Right rotation on Right axis .   Sacroiliac (SI) Joint Fixation Test: Negative .   Hip Flexor Tightness:  Positive: , Positive:   >Right   Hamstring Tightness:  Positive: , LEFT. > Right.   All findings remain relatively the same since last visit.     General (HIP/PELVIS):All findings improved somewhat since last visit  Location:  LEFT Hip .   Erythema:  Negative.   Edema: Negative  Effusion:  Negative.   Warmth:  Negative.   Ecchymosis/Bruising: Negative.   Percussion Test:  Negative.   Tuning Fork Test:  Negative.   Abrasions:  Negative.   Orientation:  Symmetrical.   ROM:  Positive: , Still slightly decreased All findings improved somewhat since last visit  Internal Rotation (30-35 degrees) Causes pain  External Rotation (45-60 degrees) Causes pain  Flexion (120 degrees) Causes pain  Extension (15-30 degrees)Causes pain  ABduction (45-50 degrees) Causes pain  ADduction (20-30 degrees) Causes pain  FROM Sacral Flexion and Sacral Extension.     Muscle Strength:Positive: All findings improved somewhat since last visit  +5/+5 Hamstring Flexion  +5/+5 Quadricep Extension  +4-+5/+5 Hip Flexion Causes pain  +4-+5/+5 Hip Extension Causes  painn  +4-+5/+5 Hip ABduction toward body Causes pain  +4-+5/+5 Hip ADduction away from body Causes pain  +4-+5/+5 Hip Internal Rotation at 90 Degrees Causes pain  +4-+5/+5 Hip External Rotation at 90 Degrees Causes pain  +4-+5/+5 Hip Internal Rotation at 0 Degrees Causes pain  +4-+5/+5 Hip External Rotation at 0 Degrees Causes pain.                   DTR/Neurological: Sensation Intact, 2-Point Discrimination: Negative.   Palpation: Positive: tender to palpation in the following areas:  hip flexor, as well as ADDuctors; Pectineus; Glutes around hip joint; and IT Band proximal tensor fascia latae.   Vascular:  Negative: Normal Pulses , +2/+4, , Femoral Artery, DP, PT, and Capillary Refill < 2 seconds.     Function Tests: All findings improved somewhat since last visit  Test for Rectus Femoris Contracture: Positive:  Hip Extension Test: Positive:  Iliotibial Tract Test: Positive:   Hunter :  Positive:   Hunter Test:  Positive:   Juan Test: Positive:      Hip/Pelvis - Flexibility: All findings improved somewhat since last visit  Hamstring Positive:   Hip Flexor  Positive:.   IT-Band  Positive:      Hip/Pelvis - Hip Contractures:  Finger Tip Test: Negative.   Hunter Test: Negative.   Hunter  Test: Negative.   Daniel Sign: Negative.   Piriformis Test 1: Negative.   Piriformis Test 2: Negative.     Hip/Pelvis - Hip Dysplasia:  Trochanter Irritation Sign: Negative.   Galeazzi Test: Negative.    Kalchschmidt Test: Negative.     Hip/Pelvis - Impingement/Labrum: All findings improved somewhat since last visit  CARYN Test:  Positive:   FADIR Test:  Positive:  Hip Scouring Test:  Positive:  Arauz's Test:  Positive:   Posterior Labrum (Posterior Margin) Test: Positive:   Posterior Impingement (Posterior Labrum) [Torque] Test:  Positive:.   Anterior Impingement (Anterior Labrum) Test:  Positive:      Hip/Pelvis - IT Band Syndrome:  Juan's (Iliotibial Tract) Test: Negative.   Jose Antonio Compression Test: Negative.   J-Sign:  Negative.     Hip/Pelvis - SFE:  Drehmann Test: Negative.     Hip/Pelvis - Trochanteric/Pelvis Muscle Function:  Trendelenburg/Duchenne Sign: Negative.   Duchenne Sign: Negative.                                   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  COMPARISON: MRI pelvis 05/09/2022.  ACCESSION NUMBER(S): [id number]  ORDERING CLINICIAN: JULIA GRIFFIN      TECHNIQUE:  Multiplanar multisequence MRI of the  left hip was performed after  intra-articular administration of gadolinium based contrast.      FINDINGS:  Bones/Marrow:  No evidence of fracture, dislocation, or contusion.  Bone marrow signal intensity is within normal limits.      Labrum:  Diffuse intrasubstance irregularity and contrast imbibition  into the anterosuperior acetabular labrum.      Joint: No significant femoroacetabular articular cartilage defect.  The hip joint is distended by contrast.      Bursa:  No trochanteric bursitis.      Tendons:  The rectus femoris and iliopsoas tendons are intact. Mild  gluteus minimus and medius insertional tendinosis. The hamstring  tendon origins are unremarkable.      Muscles:  Normal signal intensity and bulk.      Nerves:  The sciatic nerve bundles are normal in appearance.      Internal organs/other:  Limited evaluation of the internal organs of  the pelvis does not demonstrate a focal abnormality, although note is  made that this study is tailored for evaluation of the  musculoskeletal structures of the pelvis.      IMPRESSION:  Anterosuperior acetabular labral tear.      2. Mild gluteus minimus and medius insertional tendinosis.    I personally reviewed the images/study and I agree with the findings  as stated. This study was interpreted at [place], [place].      Signed by: Mathew Melendrez 11/21/2023  3:00 PM  Dictation workstation:   [id number]  -----------------------------------------------------------------  STUDY: HIP LT 2 VIEW W OR WO AP PELVIS; 9/18/2023 3:45 pm     INDICATION:  TROCHANTERIC BURSITIS, LEFT HIP. 52-year-old woman with left hip pain  radiating to the buttocks. No recent injury     COMPARISON: Pelvic radiograph 04/28/2022  ACCESSION NUMBER(S): [id number]  ORDERING CLINICIAN: JULIA GRIFFIN  TECHNIQUE:  Two views of the left hip were obtained.     FINDINGS:  No sign of acute left hip fracture or dislocation. Left femoroacetabular  joint space overall appears maintained. There is a well corticated 13 mm  density adjacent to the left greater trochanter on the frogleg lateral view,  nonspecific. Calcified pelvic phleboliths are suspected. Intrauterine device  overlies the pelvis.     IMPRESSION:  No sign of acute left hip fracture or dislocation. The joint space overall  appears maintained.     There is a well corticated 13 mm density adjacent to the left greater  trochanter on the frogleg lateral view, nonspecific. This could relate to  previous injury or potentially soft tissue calcification.    Dictation workstation:   [id number]     Original Interpreting Physician:   DUGLAS LAZO MD  Original Transcribed by/Date: [facility] Sep 18 2023  3:09P  --------------------------------------------------------  PROCEDURE:     SPINE LUMBAR WO CONTRAST - TMR  2010  REASON FOR EXAM: LEFT BUTTOCK PAIN  RESULT: MRN: [id number] Patient Name: PRIYANK REHMAN  STUDY: SPINE LUMBAR WO CONTRAST; 9/11/2023 1:32 pm  INDICATION: LEFT BUTTOCK PAIN. /low back pain into right buttock region  COMPARISON: None  ACCESSION NUMBER(S) [id number]  ORDERING CLINICIAN: JULIA GRIFFIN  TECHNIQUE:  Multiple, multiplanar sequences of the lumbar spine were obtained.             FINDINGS:  The normal lumbar lordosis is preserved. The conus terminates at L1-L2.   No acute fracture or spondylolisthesis is identified. Degenerative   endplate  changes can be seen at L4-L5. Schmorl's node seen no other STIR   abnormalities are seen within the marrow. Bone marrow reconversion is   noted.             The vertebral bodies are grossly preserved.             T12-L1: Broad-based disc protrusion and facet arthropathy causing no    significant canal or foraminal stenosis.  L1-L2: Diffuse disc bulge and facet arthropathy causing no significant   canal or foraminal stenosis.  L2-L3: Broad-based disc protrusion, ligamentum flavum hypertrophy and   facet arthropathy causing no significant canal or foraminal stenosis.  L3-L4: Right foraminal disc protrusion, ligamentum flavum hypertrophy   facet arthropathy causing right lateral recess stenosis no significant   canal stenosis and moderate bilateral foraminal stenosis.  L4-L5: Broad-based disc protrusion and facet arthropathy causing no   significant canal stenosis and no left foraminal stenosis but moderate to   severe right foraminal stenosis.  L5-S1: No disc herniation. No significant canal or foraminal stenosis.             IMPRESSION:  1. Degenerative disc disease and spondylosis as above T12-L5  2. Disc bulge L1-L2, L5-S1 (to the LEFT)   3. Disc protrusion T12-L1; L2-L3;L3-L4; and L4-L5  4. BILATERAL neural foraminal stenosis, moderate L3-L4, moderate to severe RIGHT side L4-L5            Dictation workstation:   [id number]  Original Interpreting Physician:   EMILY EDMONDS MD  Original Transcribed by/Date: MMYURY Sep 11 2023 12:07P  Original Electronically Signed by/Date: EMILY EDMONDS MD Sep 11 2023    __________________________________________________________________  PROCEDURE: PELVIS WO CONTRAST - TMR 2057  REASON FOR EXAM: LEFT BUTTOCK PAIN  RESULT: CLINICAL HISTORY: Injury playing tennis/left buttock pain and 10          weeks  COMPARISON: X-ray pelvis 4/28/2022  TECHNIQUE: Multiple, multiplanar sequences of the pelvis were obtained.            FINDINGS:  Bilateral acetabular rim osteophytic changes  are identified.            Left hamstring origin tendinosis can be seen with mild surrounding edema  identified.            No acute fracture or dislocation is seen. There are no MR signs of AVN or  significant hip effusion.            Trochanteric bursitis is noted.            The SI joints are intact. Degenerative disc disease and spondylosis can be  seen.            Incidental note is made of suspected degenerative tearing of the  anterior/superior karon bilaterally.            The marrow is appears grossly normal. The musculature and soft tissues are  unremarkable.            MR sequences were not optimized for evaluation of intrapelvic contents.  Incidental uterine fibroids are noted. IUD is noted within the endometrial  canal.            IMPRESSION:  1. Mild left hamstring origin tendinosis/tendinitis. The semitendinosus,  semimembranosus and biceps femoris tendon origins appear intact.  2. No MR findings of internal derangement of the pelvis.  3. Incindental uterine fibroids noted.  4. Trochanteric bursitis is noted.  5. Incidental note is made of suspected degenerative tearing of the  anterior/superior karon bilaterally.  6. Degenerative disc disease and spondylosis can be  seen.            This report has been produced using speech recognition.  Original Interpreting Physician: EMILY EDMONDS MD  Original Transcribed by/Date: [facility] May 9 2022 5:10P  ___________________________________  Left leg shorter than right leg by the following:  Iliac crest 6.7 millimeters  Sacral base 3.8 millimeters  Midline femoral head 6.8 millimeters  Median difference of left leg being shorter than right leg is approximately 5.77 millimeters            PROCEDURE: PELVIS 1 OR 2 VIEW - TXR [id number]  REASON FOR EXAM: MYALGIA, OTHER SITE  RESULT: EXAM: AP pelvis, one view, weightbearing   CLINICAL HISTORY: Leg length discrepancy            FINDINGS:  No fractures or destructive lesions are identified.   Pelvic ring is intact.   Lumbar  spine is tilted to the right suggesting a lumbar dextroscoliosis.   The right femoral head is positioned 7 mm higher then the left.   There is a T-shaped IUD overlying the pelvis.            IMPRESSION:  1. No acute pathologic findings are identified.  2. Right femoral head is positioned 7 mm higher than the left.  3. Probable lumbar dextroscoliosis partially included within the field-of-view.            This report has been produced using speech recognition.  Original Interpreting Physician: MARCY BRITO M.D.  Original Transcribed by/Date: [facility] Apr 28 2022 11:16A                 Assessment   1. Labral tear of hip, degenerative      MRI shows: Anterosuperior acetabular labral tear.      2. Pain of left hip        3. Tendinosis      MRI shows: Mild gluteus minimus and medius insertional tendinosis.      4. Primary osteoarthritis of left hip        5. Strain of left hamstring muscle, subsequent encounter        6. Piriformis syndrome of left side        7. Low back pain with left-sided sciatica, unspecified back pain laterality, unspecified chronicity        8. Dextroscoliosis of lumbosacral spine        9. Spondylosis of lumbar spine        10. Bulging of intervertebral disc between L4 and L5        11. Sacral region somatic dysfunction        12. Hamstring tightness of both lower extremities        13. Hip flexor tightness, unspecified laterality        14. Leg length discrepancy        15. Valgus deformity of both feet          Treatment or Intervention:  May use to alternate ice and moist heat as needed    Into physical Therapy 1-2 times a week for 8-10 weeks with manual therapy as well as dry needling and IASTM with lumbar traction  Reviewed home exercises and flexibility exercises to be performed by the patient routinely  Once again, Stressed the importance of wearing shoes with good stability control to help with the biomechanics affecting the lower legs  Once again, stressed the importance of wearing full  foot insoles to help with the biomechanics affecting the lower legs    Once again, recommendation over-the-counter  vitamin-D 2 -3000+ milligrams a day, as directed daily to promote bony healing, in addition to a daily multivitamin.    Once again, recommendation to continue over-the-counter curcumin, turmeric, boswellia, as well as egg shell membrane as directed to aid with joint inflammation.    Once again, recommendation to continue over-the-counter Move Free for joint health  The patient may continue to take Advil Liquid Gels and  the patient may alternate with OTC Tylenol Extra Strength or OTC Tylenol Arthritis, taking one every 6-8 hours with food as needed.  Patient advised regarding the risks and/or potential adverse reactions and/or side effects of any prescribed medications along with any over-the-counter medications or any supplements used. Patient advised to seek immediate medical care if any adverse reactions occur. The patient and/or patient(s) parent(s) verbalized their understanding.    Discussed in detail with the patient to the level of their understanding the possibility in the future of regenerative injections versus corticosteroid injections    Discussed in detail with the patient to the level of their understanding the possibility in the future will perform OMT through the hip flexor(s) as well as associated musculature including the aDductors, pectineus, obturator, and gamellus.    At the patient's next office visit, will provide appropriate 6 millimeter heel lift to be placed in the LEFT shoe to accommodate for leg length discrepancy found on standing erect pelvis xray   Reviewed LEFT hip MR Arthrogram in detail with the patient and/or patients parent/legal guardian to their level of understanding; a copy of these results were provided to the patient and/or patients parent/legal guardian at the time of this office visit. Discussed treatment options with the patient and/or patients parent/legal  guardian in detail to the level of their understanding. The patient and/or patients parent/legal guardian verbalized their understanding and agreement to the treatment plan at the time of this office visit.   Possibility of regenerative injections in the future prozone alternating with PRP   follow-up once she has decided if she wants injections for patients Left Hip or sooner as needed.    Diagnostic studies:  Left leg shorter than right leg is approximately 5.77 mm    Activity Instructions, Restrictions, and Accommodations:  The family has been provided a note (after visit summary) outlining all current activity instructions, restrictions, and accommodations.    Consultations/Referrals:  None at this time.    Follow-up once she has decided if she wants injection for he patients Left Hip or sooner as needed. Stephanie RAINES, attest this documentation has been prepared under the direction and in the presence of Omar Espinosa D.O. By signing below, Med RAINES D.O, personally performed the services described in this documentation. All medical record entries made by the scribe were at my direction and in my presence. I have reviewed the chart and agree that the record reflects my personal performance and is accurate and complete. Please note that this report has been partially produced using speech recognition software. It may contain errors related to grammar, punctuation or spelling. Electronically signed, but not reviewed. Med Espinosa D.O. Director of Sports Medicine.     STEPHANIE NELSON on 12/5/23 at 9:56 AM.     Med Espinosa DO, FAOASM

## 2023-12-05 ENCOUNTER — OFFICE VISIT (OUTPATIENT)
Dept: SPORTS MEDICINE | Facility: CLINIC | Age: 53
End: 2023-12-05
Payer: COMMERCIAL

## 2023-12-05 VITALS
HEIGHT: 65 IN | WEIGHT: 128 LBS | HEART RATE: 68 BPM | DIASTOLIC BLOOD PRESSURE: 72 MMHG | BODY MASS INDEX: 21.33 KG/M2 | SYSTOLIC BLOOD PRESSURE: 102 MMHG

## 2023-12-05 DIAGNOSIS — M62.9 HAMSTRING TIGHTNESS OF BOTH LOWER EXTREMITIES: ICD-10-CM

## 2023-12-05 DIAGNOSIS — M21.70 LEG LENGTH DISCREPANCY: ICD-10-CM

## 2023-12-05 DIAGNOSIS — M16.12 PRIMARY OSTEOARTHRITIS OF LEFT HIP: ICD-10-CM

## 2023-12-05 DIAGNOSIS — M51.27 PROTRUSION OF INTERVERTEBRAL DISC OF LUMBOSACRAL REGION: ICD-10-CM

## 2023-12-05 DIAGNOSIS — M24.159 LABRAL TEAR OF HIP, DEGENERATIVE: Primary | ICD-10-CM

## 2023-12-05 DIAGNOSIS — S76.312D STRAIN OF LEFT HAMSTRING MUSCLE, SUBSEQUENT ENCOUNTER: ICD-10-CM

## 2023-12-05 DIAGNOSIS — M79.18 LEFT BUTTOCK PAIN: ICD-10-CM

## 2023-12-05 DIAGNOSIS — M25.552 PAIN OF LEFT HIP: ICD-10-CM

## 2023-12-05 DIAGNOSIS — M67.80 TENDINOSIS: ICD-10-CM

## 2023-12-05 DIAGNOSIS — M48.07 NEURAL FORAMINAL STENOSIS OF LUMBOSACRAL SPINE: ICD-10-CM

## 2023-12-05 DIAGNOSIS — M99.04 SACRAL REGION SOMATIC DYSFUNCTION: ICD-10-CM

## 2023-12-05 DIAGNOSIS — M54.42 LOW BACK PAIN WITH LEFT-SIDED SCIATICA, UNSPECIFIED BACK PAIN LATERALITY, UNSPECIFIED CHRONICITY: ICD-10-CM

## 2023-12-05 DIAGNOSIS — M24.559 HIP FLEXOR TIGHTNESS, UNSPECIFIED LATERALITY: ICD-10-CM

## 2023-12-05 DIAGNOSIS — M41.87 DEXTROSCOLIOSIS OF LUMBOSACRAL SPINE: ICD-10-CM

## 2023-12-05 DIAGNOSIS — M51.36 BULGING OF INTERVERTEBRAL DISC BETWEEN L4 AND L5: ICD-10-CM

## 2023-12-05 DIAGNOSIS — G57.02 PIRIFORMIS SYNDROME OF LEFT SIDE: ICD-10-CM

## 2023-12-05 DIAGNOSIS — Q66.6 VALGUS DEFORMITY OF BOTH FEET: ICD-10-CM

## 2023-12-05 DIAGNOSIS — S76.012D STRAIN OF GLUTEUS MEDIUS OF LEFT LOWER EXTREMITY, SUBSEQUENT ENCOUNTER: ICD-10-CM

## 2023-12-05 DIAGNOSIS — M47.816 SPONDYLOSIS OF LUMBAR SPINE: ICD-10-CM

## 2023-12-05 PROCEDURE — 99214 OFFICE O/P EST MOD 30 MIN: CPT | Performed by: FAMILY MEDICINE

## 2023-12-05 PROCEDURE — 1036F TOBACCO NON-USER: CPT | Performed by: FAMILY MEDICINE

## 2023-12-05 RX ORDER — ESTRADIOL 1 MG/1
1 TABLET ORAL
COMMUNITY
Start: 2023-11-06

## 2023-12-05 ASSESSMENT — PAIN DESCRIPTION - DESCRIPTORS: DESCRIPTORS: ACHING;BURNING

## 2023-12-05 ASSESSMENT — PAIN SCALES - GENERAL: PAINLEVEL_OUTOF10: 2

## 2023-12-05 ASSESSMENT — PAIN - FUNCTIONAL ASSESSMENT: PAIN_FUNCTIONAL_ASSESSMENT: 0-10

## 2023-12-12 ENCOUNTER — ANESTHESIA EVENT (OUTPATIENT)
Dept: OPERATING ROOM | Facility: HOSPITAL | Age: 53
End: 2023-12-12
Payer: COMMERCIAL

## 2023-12-12 ENCOUNTER — ANESTHESIA (OUTPATIENT)
Dept: OPERATING ROOM | Facility: HOSPITAL | Age: 53
End: 2023-12-12
Payer: COMMERCIAL

## 2023-12-12 ENCOUNTER — HOSPITAL ENCOUNTER (OUTPATIENT)
Facility: HOSPITAL | Age: 53
Setting detail: OUTPATIENT SURGERY
Discharge: HOME | End: 2023-12-12
Attending: OBSTETRICS & GYNECOLOGY | Admitting: OBSTETRICS & GYNECOLOGY
Payer: COMMERCIAL

## 2023-12-12 VITALS
OXYGEN SATURATION: 100 % | DIASTOLIC BLOOD PRESSURE: 78 MMHG | RESPIRATION RATE: 14 BRPM | HEART RATE: 56 BPM | TEMPERATURE: 98.1 F | BODY MASS INDEX: 21.5 KG/M2 | WEIGHT: 129.19 LBS | SYSTOLIC BLOOD PRESSURE: 109 MMHG

## 2023-12-12 DIAGNOSIS — N39.3 STRESS INCONTINENCE IN FEMALE: Primary | ICD-10-CM

## 2023-12-12 LAB — PREGNANCY TEST URINE, POC: NEGATIVE

## 2023-12-12 PROCEDURE — 2500000005 HC RX 250 GENERAL PHARMACY W/O HCPCS

## 2023-12-12 PROCEDURE — A51715 PR ENDOSCOPIC INJECTION/IMPLANT: Performed by: ANESTHESIOLOGY

## 2023-12-12 PROCEDURE — 3700000001 HC GENERAL ANESTHESIA TIME - INITIAL BASE CHARGE: Performed by: OBSTETRICS & GYNECOLOGY

## 2023-12-12 PROCEDURE — 7100000010 HC PHASE TWO TIME - EACH INCREMENTAL 1 MINUTE: Performed by: OBSTETRICS & GYNECOLOGY

## 2023-12-12 PROCEDURE — 7100000001 HC RECOVERY ROOM TIME - INITIAL BASE CHARGE: Performed by: OBSTETRICS & GYNECOLOGY

## 2023-12-12 PROCEDURE — A4217 STERILE WATER/SALINE, 500 ML: HCPCS | Performed by: OBSTETRICS & GYNECOLOGY

## 2023-12-12 PROCEDURE — 7100000002 HC RECOVERY ROOM TIME - EACH INCREMENTAL 1 MINUTE: Performed by: OBSTETRICS & GYNECOLOGY

## 2023-12-12 PROCEDURE — A51715 PR ENDOSCOPIC INJECTION/IMPLANT

## 2023-12-12 PROCEDURE — 7100000009 HC PHASE TWO TIME - INITIAL BASE CHARGE: Performed by: OBSTETRICS & GYNECOLOGY

## 2023-12-12 PROCEDURE — C1889 IMPLANT/INSERT DEVICE, NOC: HCPCS | Performed by: OBSTETRICS & GYNECOLOGY

## 2023-12-12 PROCEDURE — 3600000008 HC OR TIME - EACH INCREMENTAL 1 MINUTE - PROCEDURE LEVEL THREE: Performed by: OBSTETRICS & GYNECOLOGY

## 2023-12-12 PROCEDURE — 3600000003 HC OR TIME - INITIAL BASE CHARGE - PROCEDURE LEVEL THREE: Performed by: OBSTETRICS & GYNECOLOGY

## 2023-12-12 PROCEDURE — 2500000004 HC RX 250 GENERAL PHARMACY W/ HCPCS (ALT 636 FOR OP/ED)

## 2023-12-12 PROCEDURE — 3700000002 HC GENERAL ANESTHESIA TIME - EACH INCREMENTAL 1 MINUTE: Performed by: OBSTETRICS & GYNECOLOGY

## 2023-12-12 PROCEDURE — 2500000004 HC RX 250 GENERAL PHARMACY W/ HCPCS (ALT 636 FOR OP/ED): Performed by: OBSTETRICS & GYNECOLOGY

## 2023-12-12 PROCEDURE — 81025 URINE PREGNANCY TEST: CPT | Performed by: OBSTETRICS & GYNECOLOGY

## 2023-12-12 PROCEDURE — 51715 ENDOSCOPIC INJECTION/IMPLANT: CPT | Performed by: OBSTETRICS & GYNECOLOGY

## 2023-12-12 PROCEDURE — 2780000003 HC OR 278 NO HCPCS: Performed by: OBSTETRICS & GYNECOLOGY

## 2023-12-12 DEVICE — BULKING SYSTEM, BULKAMID URETHRAL, HOSP OUTPATIENT ONLY: Type: IMPLANTABLE DEVICE | Site: URETHRA | Status: FUNCTIONAL

## 2023-12-12 RX ORDER — PHENAZOPYRIDINE HYDROCHLORIDE 100 MG/1
100 TABLET, FILM COATED ORAL 3 TIMES DAILY PRN
Qty: 5 TABLET | Refills: 0 | Status: SHIPPED | OUTPATIENT
Start: 2023-12-12

## 2023-12-12 RX ORDER — OXYCODONE HYDROCHLORIDE 5 MG/1
5 TABLET ORAL EVERY 4 HOURS PRN
Status: DISCONTINUED | OUTPATIENT
Start: 2023-12-12 | End: 2023-12-12 | Stop reason: HOSPADM

## 2023-12-12 RX ORDER — CEFAZOLIN 1 G/1
INJECTION, POWDER, FOR SOLUTION INTRAVENOUS AS NEEDED
Status: DISCONTINUED | OUTPATIENT
Start: 2023-12-12 | End: 2023-12-12

## 2023-12-12 RX ORDER — PROPOFOL 10 MG/ML
INJECTION, EMULSION INTRAVENOUS CONTINUOUS PRN
Status: DISCONTINUED | OUTPATIENT
Start: 2023-12-12 | End: 2023-12-12

## 2023-12-12 RX ORDER — DEXAMETHASONE SODIUM PHOSPHATE 4 MG/ML
INJECTION, SOLUTION INTRA-ARTICULAR; INTRALESIONAL; INTRAMUSCULAR; INTRAVENOUS; SOFT TISSUE AS NEEDED
Status: DISCONTINUED | OUTPATIENT
Start: 2023-12-12 | End: 2023-12-12

## 2023-12-12 RX ORDER — MIDAZOLAM HYDROCHLORIDE 1 MG/ML
INJECTION, SOLUTION INTRAMUSCULAR; INTRAVENOUS AS NEEDED
Status: DISCONTINUED | OUTPATIENT
Start: 2023-12-12 | End: 2023-12-12

## 2023-12-12 RX ORDER — SODIUM CHLORIDE, SODIUM LACTATE, POTASSIUM CHLORIDE, CALCIUM CHLORIDE 600; 310; 30; 20 MG/100ML; MG/100ML; MG/100ML; MG/100ML
100 INJECTION, SOLUTION INTRAVENOUS CONTINUOUS
Status: DISCONTINUED | OUTPATIENT
Start: 2023-12-12 | End: 2023-12-12 | Stop reason: HOSPADM

## 2023-12-12 RX ORDER — ONDANSETRON HYDROCHLORIDE 2 MG/ML
INJECTION, SOLUTION INTRAVENOUS AS NEEDED
Status: DISCONTINUED | OUTPATIENT
Start: 2023-12-12 | End: 2023-12-12

## 2023-12-12 RX ORDER — CEFAZOLIN SODIUM 2 G/100ML
2 INJECTION, SOLUTION INTRAVENOUS ONCE
Status: DISCONTINUED | OUTPATIENT
Start: 2023-12-12 | End: 2023-12-12 | Stop reason: HOSPADM

## 2023-12-12 RX ORDER — ACETAMINOPHEN 325 MG/1
650 TABLET ORAL EVERY 4 HOURS PRN
Status: DISCONTINUED | OUTPATIENT
Start: 2023-12-12 | End: 2023-12-12 | Stop reason: HOSPADM

## 2023-12-12 RX ORDER — ONDANSETRON HYDROCHLORIDE 2 MG/ML
4 INJECTION, SOLUTION INTRAVENOUS ONCE AS NEEDED
Status: DISCONTINUED | OUTPATIENT
Start: 2023-12-12 | End: 2023-12-12 | Stop reason: HOSPADM

## 2023-12-12 RX ORDER — LIDOCAINE HYDROCHLORIDE 20 MG/ML
INJECTION, SOLUTION EPIDURAL; INFILTRATION; INTRACAUDAL; PERINEURAL AS NEEDED
Status: DISCONTINUED | OUTPATIENT
Start: 2023-12-12 | End: 2023-12-12

## 2023-12-12 RX ORDER — ALBUTEROL SULFATE 0.83 MG/ML
2.5 SOLUTION RESPIRATORY (INHALATION) ONCE AS NEEDED
Status: DISCONTINUED | OUTPATIENT
Start: 2023-12-12 | End: 2023-12-12 | Stop reason: HOSPADM

## 2023-12-12 RX ORDER — MIDAZOLAM HYDROCHLORIDE 1 MG/ML
INJECTION INTRAMUSCULAR; INTRAVENOUS AS NEEDED
Status: DISCONTINUED | OUTPATIENT
Start: 2023-12-12 | End: 2023-12-12

## 2023-12-12 RX ORDER — FENTANYL CITRATE 50 UG/ML
INJECTION, SOLUTION INTRAMUSCULAR; INTRAVENOUS AS NEEDED
Status: DISCONTINUED | OUTPATIENT
Start: 2023-12-12 | End: 2023-12-12

## 2023-12-12 RX ORDER — SODIUM CHLORIDE 0.9 G/100ML
IRRIGANT IRRIGATION AS NEEDED
Status: DISCONTINUED | OUTPATIENT
Start: 2023-12-12 | End: 2023-12-12 | Stop reason: HOSPADM

## 2023-12-12 RX ADMIN — LIDOCAINE HYDROCHLORIDE 60 MG: 20 INJECTION, SOLUTION EPIDURAL; INFILTRATION; INTRACAUDAL; PERINEURAL at 14:30

## 2023-12-12 RX ADMIN — FENTANYL CITRATE 50 MCG: 50 INJECTION, SOLUTION INTRAMUSCULAR; INTRAVENOUS at 14:31

## 2023-12-12 RX ADMIN — FENTANYL CITRATE 50 MCG: 50 INJECTION, SOLUTION INTRAMUSCULAR; INTRAVENOUS at 14:52

## 2023-12-12 RX ADMIN — MIDAZOLAM HYDROCHLORIDE 2 MG: 1 INJECTION, SOLUTION INTRAMUSCULAR; INTRAVENOUS at 14:25

## 2023-12-12 RX ADMIN — ONDANSETRON 4 MG: 2 INJECTION INTRAMUSCULAR; INTRAVENOUS at 14:41

## 2023-12-12 RX ADMIN — PROPOFOL 125 MCG/KG/MIN: 10 INJECTION, EMULSION INTRAVENOUS at 14:30

## 2023-12-12 RX ADMIN — SODIUM CHLORIDE, POTASSIUM CHLORIDE, SODIUM LACTATE AND CALCIUM CHLORIDE 100 ML/HR: 600; 310; 30; 20 INJECTION, SOLUTION INTRAVENOUS at 12:27

## 2023-12-12 RX ADMIN — DEXAMETHASONE SODIUM PHOSPHATE 4 MG: 4 INJECTION, SOLUTION INTRAMUSCULAR; INTRAVENOUS at 14:39

## 2023-12-12 RX ADMIN — CEFAZOLIN 2 G: 1 INJECTION, POWDER, FOR SOLUTION INTRAMUSCULAR; INTRAVENOUS at 14:34

## 2023-12-12 ASSESSMENT — COLUMBIA-SUICIDE SEVERITY RATING SCALE - C-SSRS
6. HAVE YOU EVER DONE ANYTHING, STARTED TO DO ANYTHING, OR PREPARED TO DO ANYTHING TO END YOUR LIFE?: NO
2. HAVE YOU ACTUALLY HAD ANY THOUGHTS OF KILLING YOURSELF?: NO
1. IN THE PAST MONTH, HAVE YOU WISHED YOU WERE DEAD OR WISHED YOU COULD GO TO SLEEP AND NOT WAKE UP?: NO

## 2023-12-12 ASSESSMENT — PAIN SCALES - GENERAL
PAINLEVEL_OUTOF10: 0 - NO PAIN
PAINLEVEL_OUTOF10: 3

## 2023-12-12 ASSESSMENT — PAIN - FUNCTIONAL ASSESSMENT
PAIN_FUNCTIONAL_ASSESSMENT: 0-10

## 2023-12-12 NOTE — ANESTHESIA POSTPROCEDURE EVALUATION
Patient: Angeles Valdez    Procedure Summary       Date: 12/12/23 Room / Location: [facility] OR 04 / [facility] OR    Anesthesia Start: 1427 Anesthesia Stop: 1506    Procedure: URETHRAL BULKING Diagnosis:       Stress incontinence in female      (Stress incontinence in female [N39.3])    Surgeons: Thomas Hernandez MD Responsible Provider: Alexi Yuen MD    Anesthesia Type: MAC ASA Status: 2            Anesthesia Type: MAC    Vitals Value Taken Time   /64 12/12/23 1530   Temp 36.8 °C (98.2 °F) 12/12/23 1502   Pulse 59 12/12/23 1530   Resp 13 12/12/23 1530   SpO2 98 % 12/12/23 1530       Anesthesia Post Evaluation    Patient location during evaluation: PACU  Patient participation: complete - patient participated  Level of consciousness: awake and alert  Pain management: adequate  Airway patency: patent  Cardiovascular status: acceptable and hemodynamically stable  Respiratory status: acceptable, spontaneous ventilation and nonlabored ventilation  Hydration status: acceptable  Postoperative Nausea and Vomiting: none        There were no known notable events for this encounter.

## 2023-12-12 NOTE — ANESTHESIA PREPROCEDURE EVALUATION
Patient: Angeles Valdez    Procedure Information       Date/Time: 12/12/23 1330    Procedure: Cystoscopy; Bulkamid    Location: [facility] OR 01 / Virtual [facility] OR    Surgeons: Thomas Hrenandez MD            Relevant Problems   Anesthesia   No history of complications History of anesthesia complications      Neuro/Psych   (+) Claustrophobia   (+) Lesion of deep peroneal nerve   (+) Low back pain with left-sided sciatica   (+) Piriformis syndrome of left side   (+) Radiculopathy affecting upper extremity   (+) Right cervical radiculopathy      Musculoskeletal   (+) Bulging of intervertebral disc between L4 and L5   (+) Degenerative disc disease, cervical   (+) Dextroscoliosis of lumbosacral spine   (+) Primary osteoarthritis of left hip   (+) Spondylosis of lumbar spine      Eyes, Ears, Nose, and Throat   (+) Bilateral hearing loss      Infectious Disease   (+) Recurrent cold sores      Other   (+) Cervical discitis       Clinical information reviewed:    Allergies  Meds     OB Status           NPO/Void Status  Date of Last Liquid: 12/12/23  Time of Last Liquid: 0930  Date of Last Solid: 12/11/23  Time of Last Solid: 2000  Last Intake Type: Clear fluids           Past Medical History:   Diagnosis Date    Fibroid     Migraines       Past Surgical History:   Procedure Laterality Date    BREAST SURGERY  09/21/2020    reduction mammoplasty, abdominoplasty    CERVICAL BIOPSY  W/ LOOP ELECTRODE EXCISION      COLONOSCOPY      LIPOMA RESECTION Left     excison    WISDOM TOOTH EXTRACTION       Social History     Tobacco Use    Smoking status: Former     Types: Cigarettes    Smokeless tobacco: Never   Vaping Use    Vaping Use: Never used   Substance Use Topics    Alcohol use: Never    Drug use: Never      Current Outpatient Medications   Medication Instructions    ASCORBIC ACID, VITAMIN C, ORAL 1 each, oral, Daily    ascorbic acid/collagen hydr (COLLAGEN SKIN RENEWAL ORAL) 1 each, oral, Daily RT    cyclobenzaprine (FLEXERIL)  "10 mg, oral, Nightly    estradiol (ESTRACE) 1 mg, oral, Daily RT    ibuprofen 800 mg, oral, Every 8 hours PRN, Take with food or milk     multivit with calcium,iron,min (MULTIPLE VITAMIN, WOMENS ORAL) 1 each, oral, Daily    tretinoin (Retin-A) 0.1 % cream Topical, Nightly, apply to face    tumeric-ging-olive-oreg-capryl 100 mg-150 mg- 50 mg-150 mg capsule 1 each, oral, Daily    ZINC ORAL 1 each, oral, Daily      No Known Allergies     Chemistry    Lab Results   Component Value Date/Time     07/13/2023 0920    K 5.3 (H) 07/13/2023 0920     07/13/2023 0920    CO2 21 (L) 07/13/2023 0920    BUN 10 07/13/2023 0920    CREATININE 0.7 07/13/2023 0920    Lab Results   Component Value Date/Time    CALCIUM 9.4 07/13/2023 0920    ALKPHOS 58 07/13/2023 0920    AST 30 07/13/2023 0920    ALT 14 07/13/2023 0920    BILITOT 0.7 07/13/2023 0920          Lab Results   Component Value Date/Time    WBC 4.8 07/13/2023 0920    HGB 12.6 07/13/2023 0920    HCT 37.3 07/13/2023 0920     07/13/2023 0920     No results found for: \"PROTIME\", \"PTT\", \"INR\"  No results found for this or any previous visit (from the past 4464 hour(s)).  No results found for this or any previous visit from the past 1095 days.       Visit Vitals  Pulse 70   Temp 37 °C (98.6 °F) (Temporal)   Resp 16   Wt 58.6 kg (129 lb 3 oz)   SpO2 100%   BMI 21.50 kg/m²   OB Status Implant   Smoking Status Former   BSA 1.64 m²        Physical Exam    Airway  Mallampati: II  TM distance: >3 FB  Neck ROM: full     Cardiovascular   Rhythm: regular  Rate: normal     Dental - normal exam     Pulmonary   Breath sounds clear to auscultation     Abdominal - normal exam       Other findings: 2020; Easy mask, mac 4, grade 1           Anesthesia Plan    ASA 2     MAC     intravenous induction   Anesthetic plan and risks discussed with patient.    Plan discussed with CAA.        "

## 2023-12-12 NOTE — H&P
History Of Present Illness  Angeles Valdez is a 53 y.o. female presenting with symptomatic stress urinary incontinence.     Past Medical History  Past Medical History:   Diagnosis Date    Fibroid     Migraines        Surgical History  Past Surgical History:   Procedure Laterality Date    BREAST SURGERY  09/21/2020    reduction mammoplasty, abdominoplasty    CERVICAL BIOPSY  W/ LOOP ELECTRODE EXCISION      COLONOSCOPY      LIPOMA RESECTION Left     excison    WISDOM TOOTH EXTRACTION          Social History  She reports that she has quit smoking. Her smoking use included cigarettes. She has never used smokeless tobacco. She reports that she does not drink alcohol and does not use drugs.    Family History  No family history on file.     Allergies  Patient has no known allergies.    Review of Systems     Physical Exam     Last Recorded Vitals  Pulse 70, temperature 37 °C (98.6 °F), temperature source Temporal, resp. rate 16, weight 58.6 kg (129 lb 3 oz), SpO2 100 %.    Relevant Results         Assessment/Plan   Principal Problem:    Stress incontinence in female    Patient is been counseled regarding her options for treatment of her stress urinary incontinence.  She is elected to proceed with Bulkamid therapy.  Benefits and alternatives were discussed with the patient.       I spent 20 minutes in the professional and overall care of this patient.      Thomas Hernandez MD

## 2023-12-12 NOTE — OP NOTE
URETHRAL BULKING Operative Note     Date: 2023  OR Location: [facility] A OR    Name: Angeles Valdez, : 1970, Age: 53 y.o., MRN: [id number], Sex: female    Diagnosis  Pre-op Diagnosis     * Stress incontinence in female [N39.3] Post-op Diagnosis     * Stress incontinence in female [N39.3]     Procedures  URETHRAL BULKING  57233 - IA NDSC NJX IMPLT MATRL URT&/BLDR NCK      Surgeons      * Thomas Hernandez - Primary    Resident/Fellow/Other Assistant:  Surgeon(s) and Role:  Tata Timmons MD - Fellow  Procedure Summary  Anesthesia: Monitor Anesthesia Care  ASA: II  Anesthesia Staff: Anesthesiologist: Alexi Yuen MD  C-AA: BERE Bah  Estimated Blood Loss: 0mL  Intra-op Medications: * No intraprocedure medications in log *           Anesthesia Record               Intraprocedure I/O Totals          Intake    Propofol Drip 0.00 mL    The total shown is the total volume documented since Anesthesia Start was filed.    lactated Ringer's infusion 400.00 mL    Total Intake 400 mL          Specimen: No specimens collected     Staff:   Circulator: Pablo Ocampo RN; Marly Kam RN  Scrub Person: Maite Shaw        Implants:  Implants       Type Name Action Serial No.      Implant BULKING SYSTEM, BULKAMID URETHRAL, HOSP OUTPATIENT ONLY - SNA - [id number] Implanted NA              Findings: Normal bladder and urethral mucosa, coaptation of the urethra at end of procedure     Indications: Angeles Valdez is an 53 y.o. female who is having surgery for Stress incontinence in female [N39.3].     The patient was seen in the preoperative area. The risks, benefits, complications, treatment options, non-operative alternatives, expected recovery and outcomes were discussed with the patient. The possibilities of reaction to medication, pulmonary aspiration, injury to surrounding structures, bleeding, recurrent infection, the need for additional procedures, failure to diagnose a condition, and creating a  complication requiring transfusion or operation were discussed with the patient. The patient concurred with the proposed plan, giving informed consent.  The site of surgery was properly noted/marked if necessary per policy. The patient has been actively warmed in preoperative area. Preoperative antibiotics have been ordered and given within 1 hours of incision. Venous thrombosis prophylaxis have been ordered including bilateral sequential compression devices    Procedure Details: She was then transferred to the operating suite where the patient was properly identified and the procedure was confirmed. When adequate anesthesia was obtained, the patient was prepped and draped in a dorsal lithotomy position. A time-out was performed. Preoperative antibiotics (2g Ancef) were given.    The Bulkamid cystoscope was inserted into the bladder. The needle was then advanced to 2 cm, which was then used as a distance marker to retract the bulkamid scope until the scope tip was at 2 cm proximal to the bladder neck. The needle was then retracted into the scope and then advanced into the urethral mucosa at the 7 o'clock position approximately 2 cm proximal to the bladder neck. One half of the first syringe was injected into the urethral submucosa. This was serially performed at the 5 o'clock, 2 o'clock, and 11 o'clock positions. A 10Fr catheter was used to leave the patient's bladder minimally full.    The patient was then awakened from anesthesia, having tolerated procedure well, and was taken to the recovery room in stable condition. All sponge, needle, and instrument counts were correct at the end the case.    Tata Timmons MD, Fellow    Complications:  None; patient tolerated the procedure well.    Disposition: PACU - hemodynamically stable.  Condition: stable

## 2023-12-12 NOTE — DISCHARGE INSTRUCTIONS
Call Dr. Hernandez's office for any problems and/or concerns    *Walk as much as possible, it is ok to use stairs carefully  *No lifting/straining and avoid constipation for 1 week (Avoid strenuous activity)  *Prevent constipation by using stool softeners and staying hydrated, so that you do not strain against your stitches or have pain from constipation  *Vaginal rest for 1 weeks (Do not put anything in your vagina except prescribed vaginal estrogen. Do not use tampons or douches. Do not have sex.)    Call Doctor right away for:    *Fever above 100.4/shaking chills  *Bright red vaginal bleeding or bleeding that soaks more than one sanitary pad per hour  *A foul smelling discharge from the vagina  *Trouble urinating or burning with urination  *Severe pain or bloating in your abdomen  *Persistent nausea/vomiting  *Redness, swelling, or drainage at your incision sites  *Chest pain/shortness of breath-call 911.    - You will be going home with prescriptions for bladder spasm/discomfort. Take as needed. It will color your urine bright orange/red, do not be alarmed.   - If you are able to take them and need something for pain, alternate a dose of Acetaminophen (Tylenol) and Ibuprofen (Motrin) every 3 hours. (For example, 1000mg of Tylenol at 09:00am, 600mg ibuprofen at 12:00pm, 1000mg of Tylenol at 3:00pm, 600mg ibuprofen at 6:00pm).

## 2023-12-12 NOTE — H&P
History Of Present Illness  Angeles Valdez is a 53 y.o. female presenting with pudendal neuralgia and pelvic pain.     Past Medical History  Past Medical History:   Diagnosis Date    Fibroid     Migraines        Surgical History  Past Surgical History:   Procedure Laterality Date    BREAST SURGERY  09/21/2020    reduction mammoplasty, abdominoplasty    CERVICAL BIOPSY  W/ LOOP ELECTRODE EXCISION      COLONOSCOPY      LIPOMA RESECTION Left     excison    WISDOM TOOTH EXTRACTION          Social History  She reports that she has quit smoking. Her smoking use included cigarettes. She has never used smokeless tobacco. She reports that she does not drink alcohol and does not use drugs.    Family History  No family history on file.     Allergies  Patient has no known allergies.    Review of Systems     Physical Exam     Last Recorded Vitals  Pulse 70, temperature 37 °C (98.6 °F), temperature source Temporal, resp. rate 16, weight 58.6 kg (129 lb 3 oz), SpO2 100 %.    Relevant Results         Assessment/Plan   The patient has had success with bilateral pudendal nerve blocks and pelvic floor trigger point injections.  She presents today for repeat injections due to worsening pelvic pain complaints.  Risk benefits and alternatives were discussed with the patient and consent was signed and placed in the chart       I spent 20 minutes in the professional and overall care of this patient.      Thomas Hernandez MD

## 2023-12-22 ENCOUNTER — OFFICE VISIT (OUTPATIENT)
Dept: UROLOGY | Facility: CLINIC | Age: 53
End: 2023-12-22
Payer: COMMERCIAL

## 2023-12-22 DIAGNOSIS — N39.3 STRESS INCONTINENCE IN FEMALE: ICD-10-CM

## 2023-12-22 DIAGNOSIS — Z09 POSTOP CHECK: Primary | ICD-10-CM

## 2023-12-22 LAB
POC APPEARANCE, URINE: CLEAR
POC BILIRUBIN, URINE: NEGATIVE
POC BLOOD, URINE: NEGATIVE
POC COLOR, URINE: YELLOW
POC GLUCOSE, URINE: NEGATIVE MG/DL
POC KETONES, URINE: NEGATIVE MG/DL
POC LEUKOCYTES, URINE: NEGATIVE
POC NITRITE,URINE: NEGATIVE
POC PH, URINE: 6.5 PH
POC PROTEIN, URINE: NEGATIVE MG/DL
POC SPECIFIC GRAVITY, URINE: 1.01
POC UROBILINOGEN, URINE: 0.2 EU/DL

## 2023-12-22 PROCEDURE — 99024 POSTOP FOLLOW-UP VISIT: CPT | Performed by: OBSTETRICS & GYNECOLOGY

## 2023-12-22 PROCEDURE — 1036F TOBACCO NON-USER: CPT | Performed by: OBSTETRICS & GYNECOLOGY

## 2023-12-22 PROCEDURE — 81003 URINALYSIS AUTO W/O SCOPE: CPT | Performed by: OBSTETRICS & GYNECOLOGY

## 2023-12-22 NOTE — PATIENT INSTRUCTIONS
Please follow-up in 4 weeks virtually to discuss her lower urinary tract symptoms.    [phone]

## 2023-12-22 NOTE — PROGRESS NOTES
Subjective   Patient ID: Angeles Valdez is a 53 y.o. female who presents for  post op follow up.    HPI  52-year-old with stress urinary incontinence having failed incontinence ring presenting to discuss surgical options having undergone Bulkamid  12/12/2023    The patient appears to be having benefits and is noting improvements in her stress urinary incontinence complaints. She denies any UTI like symptoms.     She denies any vaginal complaints, no abnormal bleeding or discharge.     She denies any bowel related complaints, no fecal or flatal incontinence.    She has no other complaints.      From Previous note  53-year-old with stress urinary incontinence presenting following #4 incontinence ring pessary fitting    The patient presents with the pessary expulsed when she went running. She took out the pessary and replaced it after which expulsed. The pessary was uncomfortable while it was in place.    She denies any vaginal complaints, no abnormal bleeding or discharge.     She denies any bowel related complaints, no fecal or flatal incontinence.    She has no other complaints.      From Previous note   52-year-old with stress urinary incontinence for incontinence ring pessary fitting      She has no new complaints.        From Previous note  52 - year-old patient presenting as a referral from  with complaints of stress urinary incontinence and pelvic discomfort on intercourse.     The patient presents with complaints stress urinary urinary incontinence. She complaints of episodes of incontinence on movement and walking. She does note leaking with laughing, coughing and sneezing. She denies any episodes of nocturia or enuresis. She denies any daytime urgency or frequency. She denies any History of nephrolithiasis, gross hematuria or chronic recurrent UTIs.     She is sexually active and notes discomfort with deep penetration, she denies any abnormal vaginal bleeding or discharge. She denies any vaginal dryness or  irritation. She denies any bulge complaints, no pressure or pulling.      She denies any bowel related complaints, no fecal or flatal incontinence.     She has no other complaints    Review of Systems  Constitutional: No fever, No chills and No fatigue.   Eyes: No vision problems and No dryness of the eyes.   ENT: No dry mouth, No hearing loss and No nosebleeds.   Cardiovascular: No chest pain, No palpitations and No orthopnea.   Respiratory: No shortness of breath, No cough and No wheezing.   Gastrointestinal: No abdominal pain, No constipation, No nausea, No diarrhea, No vomiting and No melena.   Genitourinary: As noted in HPI.   Musculoskeletal: No back pain, No myalgias, No muscle weakness, No joint swelling and No leg edema.   Integumentary: No rashes, No skin lesion and No itching.   Neurological: No headache, No numbness and No dizziness.   Psychiatric: No sleep disturbances, No anxiety and No depression.   Endocrine: No hot flashes, No loss of hair and No hirsutism.   Hematologic/Lymphatic: No swollen glands, No tendency for easy bleeding and No tendency for easy bruising.   All other systems have been reviewed and are negative for complaint.        Objective   Physical Exam    PHYSICAL EXAMINATION:  No LMP recorded. Patient has had an implant.  There is no height or weight on file to calculate BMI.  There were no vitals taken for this visit.  General Appearance: well appearing  Neuro: Alert and oriented   HEENT: mucous membranes moist, neck supple  Resp: No respiratory distress, normal work of breathing  MSK: normal range of motion, gait appropriate      Assessment/Plan     52-year-old with stress urinary incontinence having failed incontinence ring presenting to discuss surgical options having undergone Bulkamid  12/12/2023     1.  The patient appears to be having significant improvements in her lower urinary tract stress urinary incontinence concerns following her 12/12/2023 Bulkamid therapy.  She is  released from all postoperative restrictions.  The patient had no success with an incontinence ring which was uncomfortable and was expulsed.  She has good pelvic floor strength and no weakness and no pain. We previously discussed expectant management, pelvic floor physical therapy, pessary management, and surgical options including mid urethral sling and injectable therapy.  She will follow-up in 4 weeks to discuss her lower urinary tract symptoms.     2. The patient will follow-up in 4 weeks to discuss her lower urinary tract symptoms and possible repeat Bulkamid     J. Lorne Hernandez MD     Scribe Attestation  By signing my name below, IKeena Scribe attest that this documentation has been prepared under the direction and in the presence of Thomas Hernandez MD. All medical record entries made by the Scribe were at my direction or personally dictated by me. I have reviewed the chart and agree that the record accurately reflects my personal performance of the history, physical exam, discussion and plan.

## 2024-01-02 NOTE — PROGRESS NOTES
"Verbal consent of the patient and/or verbal parental consent for patients under the age of 18 have been obtained to conduct a physical examination at this office visit.    Established patient  History Of Present Illness  01/08/24 Angeles Valdez is a 53 y.o. female who presents for regenerative Prolozone injection #1 into her LEFT hip. She continues to perform her home strengthening and stretching exercises previously learned in Physical Therapy in addition to exercising in the gym routinely. She describes her pain as an ache occurring posteriorly proximal to her left SI joint with radiculopathy extending into her LLE. She denies any numbness or tingling at this time. She rates her pain at 3/10 today. She is not currently utilizing any pa in management interventions at this time.    Last Recorded Vitals  /68   Pulse 66   Ht 1.651 m (5' 5\")   Wt 57.2 kg (126 lb)   BMI 20.97 kg/m²      All previous Progress Notes and imaging results related to this patients chief complaint have been reviewed in preparation for this examination/procedure    Past Medical History  She has a past medical history of Fibroid and Migraines.    Surgical History  She has a past surgical history that includes Breast surgery (09/21/2020); Cervical biopsy w/ loop electrode excision; Colonoscopy; [place] tooth extraction; Lipoma resection (Left); and Urethra surgery.     Social History  She reports that she has quit smoking. Her smoking use included cigarettes. She has never used smokeless tobacco. She reports that she does not drink alcohol and does not use drugs.    Family History  No family history on file.     Allergies  Animal dander    Historical Clinical Intake  April 28, 2022--- Verbal consent of the patient and/or verbal parental consent for patients under the age of 18 have been obtained to conduct a physical examination at this office visit. This 51 year old female presents today for evaluation of left buttock/leg pain. She was " injured approximately 2 months ago while playing tennis. She was sprinting to the net and she felt a pop in the posterior aspect of left buttock and thigh and will radiate down her left leg into her foot. Denies any numbness/tingling. Denies any pain with walking, standing, laying down. Pain is worse with sitting. She has been using ice, rest, moist heat, stretching with no relief in symptoms. She has been using OTC Tylenol and NSAIDS with no relief in pain. She has been using a cervical pillow to sit on and has been using a tennis ball to apply pressure to her left pirformis/buttock. She is flying to [place] tomorrow and is hoping for a possible cortisone injection. Pain is currently 8/10 with sititng and is burning in nature.  ---------------------  May 17, 2022 Above note reviewed. Verbal consent of the patient and/or verbal parental consent for patients under the age of 18 have been obtained to conduct a physical examination at this office visit. Angeles is here for a reevaluation and to review the MRI of her PELVIS. She states that she did get mild relief of symptoms while of Prednisone, but relief was short-lived. She continues to c/o pain LEFT proximal hamstring at insertion especially when sitting. She continues to have to put a golf ball under this area when she is sitting it seems to help alleviate the pain. She also has multiple trigger points around that hamstring area and in her gluteal area. She rates pain currently at 4/10, but it can get up to 8/10 after prolonged sitting. She is not currently in PT as she has been out of the state traveling, but is wearing her insoles and shoes with good support as directed previously. In addition, she is taking Flexiril as needed for pain.  -----------------------  January 3, 2023 Above note reviewed. Verbal consent of the patient and/or verbal parental consent for patients under the age of 18 have been obtained to conduct a physical examination at this office  visit. Angeles, an established patient, is here for a reevaluation of her left hip/leg/buttock pain. Angeles did not complete physical therapy for her injury. She states she is experiencing increased pain. She states it is difficult to sit for an extended amount of time. She says her pain is is present in her left lateral hip and buttock and is radiating down her leg all the way to her ankle. Angeles describes her pain as aching and burning. She states she has been using Tylenol and OTC NSAIDs with no relief. She rates her pain today as 4/10.  -----------------------  September 18, 2023 Above note reviewed. Verbal consent of the patient and/or verbal parental consent for patients under the age of 18 have been obtained to conduct a physical examination at this office visit. Angeles is an established patient who presents today for a reevaluation of her LUMBAR spine and review of her MRI. She states that overall she is feeling significantly better but continues to get soreness in LEFT posterior hip that is alleviated by putting pressure there. She denies any numnbess/tingling in either leg. She states that she will run 30 miles per week. Pain is currently 1/10 at rest. She additionally complains of some pain that gets worse in her posterior hip. She says it feels different than the pain that shoots down her leg off and on. She says that it only hurts after running for long periods of time. We talked in detail about crosstraining and switching stuff up as she gets older with different exercises and activities. We also talked in detail about regenerative injections once again and she definitely is considering regenerative injections in the hip because she would like to try to avoid surgical intervention.  ----------------------------  12/5/23 Angeles Valdez is a 53 y.o. female who presents to review her LEFT hip MR Arthrogram. She continues to perform her home exercises previously learned in Physical Therapy. She  "describes her left hip pain as an aching, burning sensation that initiates in the left side of her low back and extends into her left IT band. She reports over the weekend she was running and felt pain diffusely throughout her left knee citing it felt as though it has \"seized\" up on her, but she kept running through the pain. She rates her pain at 2/10 today with mild improvement overall. She takes OTC Ibuprofen for pain management as needed with mild, temporary relief. Additionally, she takes OTC curcumin and Move Free as previously recommended for joint health and wears recommended supportive footwear. She states that going up and down stairs causes her pain. After she was done running this weekend she experienced numbness and tingling going down her leg.  We talked in detail about her MR arthrogram results and that it is definitely possibility that the labral tear is affecting some of the stuff in her hip but also she has some gluteal tendinosis that is interacting with her proximal portion of her tensor fascia monika and is also referring stuff down her leg as well as probably from her lumbar spine with her disc bulges and protrusions.        Review of Systems  Review of Systems:  CONSTITUTIONAL:   Negative for weight change, loss of appetite, fatigue, weakness, fever, chills, night sweats, headaches .           HEENT:   Negative for cold, cough, sore throat, sinus pain, swollen lymph nodes.           OPHTHALMOLOGY:   Negative for diminished vision, blurred vision, loss of vision, double vision.           ALLERGY:   Negative for runny nose, scratchy throat, sinus congestion, rash, facial pressure, nasal congestion, post-nasal drip.           CARDIOLOGY:   Negative for chest pain, palpitations, murmurs, irregular heart beat, shortness of breath, leg edema, dyspnea on exertion, fatigue, dizziness.           RESPIRATORY:   Negative for chest pain, shortness of breath, swelling of the legs, asthma/copd, chest " congestion, pain with breathing .           GASTROENTEROLOGY:   Negative for nausea, vomitting, heartburn, constipation, diarrhea, blood in stool, change in bowel habits, black stool.           HEMATOLOGY/LYMPH:   Negative for fatigue, loss of appetitie, easy bruising, easy bleeding, anemia, abnormal bleeding, slow healing.           ENDOCRINOLOGY:   Negative for polyuria, polydipsia, polyphagia, fatigue, weight loss, weight gain, cold intolerance, heat intolerance, diabetes.           MUSCULOSKELETAL:   Positive  for LEFT hip pain         DERMATOLOGY:   Negative for rash, bruising.           NEUROLOGY:   Negative for tingling, numbness, gait abnormality, paresthesias, weakness, sciatica.          General Examination:  GENERAL APPEARANCE: Appears well, pleasant, and cooperative, NAD.   HEENT: Normal, unremarkable.   NECK: Supple.   HEART: RRR, normal S1S2.   LUNGS: Clear to auscultation bilaterally.   ABDOMEN: Soft, NT/ND, BS present.   EXTREMITIES: No cyanosis, clubbing.   SKIN: No rash or cellulitis.   NEUROLOGIC EXAM: Awake, A&O x 3, CN's II-XII grossly intact.   PSYCH: Good eye contact, appropriate mood and affect        The scribe, Stephanie, was present in the room during the entire visit including, but not limited to the physical examination!.     General (SPINE):  Location:  LUMBAR.   Erythema:  Negative.   Edema:  Negative.   Effusion:  Negative.   TART Findings: POSITIVE:,   Tissue Texture Changes, Asymmetry, Restriction, Tenderness.  Lower lumbar spine on the left  Warmth:  Negative.   Ecchymosis/Bruising:  Negative.   Percussion Test (LUMBAR):  Negative.   Tuning Fork Test (LUMBAR):  Negative.   Percussion (Sacrum):  Negative.   Tuning Fork (Sacrum):  Negative.   Abrasions:  Negative.   Orientation (LUMBAR):  Symmetrical.   Orientation (Sacrum):  Negative.   ROM (LUMBAR):  FROM: Forward Flexion, Extension, Lateral Bending (Side Bending), and Twisting (Rotation).   ROM (Sacrum):  FROM: Sacral Flexion and  Sacral Extension.      Muscle Strength:  POSITIVE:, Improved  +4-+5+5 Hamstring Flexion  +5/+5 Quadricep Extension  +5/+5 Hip Flexion  +4-+5+5 Hip Extension  +4-+5/+5 Hip ABduction toward body  +5/+5 Hip ADduction away from body  +5/+5 Hip Internal Rotation at 90 Degrees  +5/+5 Hip External Rotation at 90 Degrees  +5/+5 Hip Internal Rotation at 0 Degrees  +5/+5 Hip External Rotation at 0 Degrees.             DTR/Neurological: +2/+4 Patellar Reflex (L-4)  +2/+4 Posterior Tibialis and Medial Hamstrings Reflex (L5)  +2/+4 Achilles Reflex (S-1).      Sensation/Neurological Lumbar: Negative, Sensation Intact, 2-Point Discrimination Negative  L1: Low back, hips, and groin  L2: Low back and front of inside of upper leg/thigh  L3: Low back and front of upper leg/thigh  L4: Low back, front of upper leg/thigh, front of lower leg/calf, front of medial area of knee, and inside of ankle  L5: Low back, front and lateral knee, front and outside of lower leg/calf, top and bottom of foot and first four toes especially big toe.     Sensation/Neurological Sacrum: Negative, Sensation Intact, 2 -Point Discrimination Test: Negative  S1: low back, back of upper leg/thigh, back and inside of lower leg, calf and little toe  S2: Buttocks, genitals, back of upper leg/thigh and calves  S3: Buttocks and genitals  S4: Buttocks  S5: Buttocks.      Sensation/Neurological Coccygeal: Negative, Sensation Intact, 2-Point Discrimination: Negative  Coccyx: Buttocks and area of tailbone.      Palpation: Positive, Tenderness to Palpation LEFT PROXIMAL IT band throughout, greater trochanter, common hamstring tendon and hip flexor       Vascular: Capillary Refill < 2 seconds  +2/+4Carotid  +2/+4 Dorsalis Pedis  +2/+4 Posterior Tibial.      Feet/Foot BILATERAL Valgus Foot.                      Low Back-Disc Injury: All findings improved somewhat since last visit  Valsalva Maneuver: Negative .   Lhermitte's Sign: Negative .   Fermoral Nerve Traction Test:   Positive,  LEFT,  Slump Test:  Positive:  LEFT,  Cross Test Seated: Negative .   Seated Straight Leg Raise:  Positive:  LEFT,  Lying Straight Leg Raise Test:  Positive:   LEFT.   Laseague Sign: Negative .   Laseague Differential Test: Negative.   Laseague Drop Test: Negative .   Seated Laseague Test: Negative .   Reverse Laseague Test: Negative .   Bonnet Piriformis Test: Positive:     Bragard Test: Negative .   Duchenne Trendelenberg Test: Negative .   Kernig-Brudzinski Test: Negative .   Tip Toe Heel Walking Test: Negative .   Carina Prone Knee Flexion Test: Negative .         Low Back-Hip:All findings improved somewhat since last visit  Juan David/CARYN Test: Positive:   FADIR Test:  Positive:   Iliolumbar Ligament Test: Negative.   Sacrospinous and SI Ligament Test: Negative .   Sacrotuberal Ligament Test: Negative .   Psoas Sign: Positive:         Low Back-Sciatica:  Wilson Test: Negative.      Low Back-SI Joint:  Three-Phase Hyperextension Test: Negative .   Spine Test: Negative .   Yeoman Test: Negative .   Josie Test: Negative .   Sacroilliac Stress Test: Negative.   Abduction Stress Test: Negative .      Low Back-Spondy:All findings improved somewhat since last visit  Stork Test: Positive:    Sphinx Test: Positive:    Modified Sphinx Test: Positive:         Hip/Pelvis - Sacrum:  Leg Length Supine: Positive: ,  LEFT shorter than Right .   Leg Length Supine to Seated (Derbolowsky Sign):  Positive:  Standing Flexion Test:  Positive: LEFT .   Seated Flexion Test:  Positive: , LEFT.   Spring Test: Negative .   Sacral Somatic Dysfunction: Positive: Right rotation on Right axis .   Sacroiliac (SI) Joint Fixation Test: Negative .   Hip Flexor Tightness:  Positive: , Positive:   >Right   Hamstring Tightness:  Positive: , LEFT. > Right.   All findings remain relatively the same since last visit.      General (HIP/PELVIS):All findings improved somewhat since last visit  Location:  LEFT Hip .   Erythema:  Negative.    Edema: Negative  Effusion:  Negative.   Warmth:  Negative.   Ecchymosis/Bruising: Negative.   Percussion Test:  Negative.   Tuning Fork Test:  Negative.   Abrasions:  Negative.   Orientation:  Symmetrical.   ROM:  Positive: , Still slightly decreased All findings improved somewhat since last visit  Internal Rotation (30-35 degrees) Causes pain  External Rotation (45-60 degrees) Causes pain  Flexion (120 degrees) Causes pain  Extension (15-30 degrees)Causes pain  ABduction (45-50 degrees) Causes pain  ADduction (20-30 degrees) Causes pain  FROM Sacral Flexion and Sacral Extension.      Muscle Strength:Positive: All findings improved somewhat since last visit  +5/+5 Hamstring Flexion  +5/+5 Quadricep Extension  +4-+5/+5 Hip Flexion Causes pain  +4-+5/+5 Hip Extension Causes painn  +4-+5/+5 Hip ABduction toward body Causes pain  +4-+5/+5 Hip ADduction away from body Causes pain  +4-+5/+5 Hip Internal Rotation at 90 Degrees Causes pain  +4-+5/+5 Hip External Rotation at 90 Degrees Causes pain  +4-+5/+5 Hip Internal Rotation at 0 Degrees Causes pain  +4-+5/+5 Hip External Rotation at 0 Degrees Causes pain.                   DTR/Neurological: Sensation Intact, 2-Point Discrimination: Negative.   Palpation: Positive: tender to palpation in the following areas:  hip flexor, as well as ADDuctors; Pectineus; Glutes around hip joint; and IT Band proximal tensor fascia latae.   Vascular:  Negative: Normal Pulses , +2/+4, , Femoral Artery, DP, PT, and Capillary Refill < 2 seconds.      Function Tests: All findings improved somewhat since last visit  Test for Rectus Femoris Contracture: Positive:  Hip Extension Test: Positive:  Iliotibial Tract Test: Positive:   Hunter :  Positive:   Hunter Test:  Positive:   Juan Test: Positive:        Hip/Pelvis - Flexibility: All findings improved somewhat since last visit  Hamstring Positive:   Hip Flexor  Positive:.   IT-Band  Positive:        Hip/Pelvis - Hip Contractures:  Finger Tip  Test: Negative.   Hunter Test: Negative.   Hunter  Test: Negative.   Daniel Sign: Negative.   Piriformis Test 1: Negative.   Piriformis Test 2: Negative.      Hip/Pelvis - Hip Dysplasia:  Trochanter Irritation Sign: Negative.   Galeazzi Test: Negative.    Kalchschmidt Test: Negative.      Hip/Pelvis - Impingement/Labrum: All findings improved somewhat since last visit  CARYN Test:  Positive:   FADIR Test:  Positive:  Hip Scouring Test:  Positive:  Arauz's Test:  Positive:   Posterior Labrum (Posterior Margin) Test: Positive:   Posterior Impingement (Posterior Labrum) [Torque] Test:  Positive:.   Anterior Impingement (Anterior Labrum) Test:  Positive:        Hip/Pelvis - IT Band Syndrome:  Juan's (Iliotibial Tract) Test: Negative.   Jose Antonio Compression Test: Negative.   J-Sign: Negative.      Hip/Pelvis - SFE:  Drehmann Test: Negative.      Hip/Pelvis - Trochanteric/Pelvis Muscle Function:  Trendelenburg/Duchenne Sign: Negative.   Duchenne Sign: Negative.                                   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  COMPARISON: MRI pelvis 05/09/2022.    ACCESSION NUMBER(S): [id number]  ORDERING CLINICIAN: JULIA GRIFFIN      TECHNIQUE: Multiplanar multisequence MRI of the  left hip was performed after  intra-articular administration of gadolinium based contrast.      FINDINGS:  Bones/Marrow:  No evidence of fracture, dislocation, or contusion.  Bone marrow signal intensity is within normal limits.      Labrum:  Diffuse intrasubstance irregularity and contrast imbibition  into the anterosuperior acetabular labrum.      Joint: No significant femoroacetabular articular cartilage defect.  The hip joint is distended by contrast.      Bursa:  No trochanteric bursitis.      Tendons:  The rectus femoris and iliopsoas tendons are intact. Mild  gluteus minimus  and medius insertional tendinosis. The hamstring  tendon origins are unremarkable.      Muscles:  Normal signal intensity and bulk.      Nerves:  The sciatic nerve bundles are normal in appearance.      Internal organs/other:  Limited evaluation of the internal organs of  the pelvis does not demonstrate a focal abnormality, although note is  made that this study is tailored for evaluation of the  musculoskeletal structures of the pelvis.      IMPRESSION:  Anterosuperior acetabular labral tear.      2. Mild gluteus minimus and medius insertional tendinosis.     I personally reviewed the images/study and I agree with the findings  as stated. This study was interpreted at [place], [place].      Signed by: Mathew Melendrez 11/21/2023 3:00 PM  Dictation workstation:   [id number]  -----------------------------------------------------------------  STUDY: HIP LT 2 VIEW W OR WO AP PELVIS; 9/18/2023 3:45 pm     INDICATION:  TROCHANTERIC BURSITIS, LEFT HIP. 52-year-old woman with left hip pain  radiating to the buttocks. No recent injury     COMPARISON: Pelvic radiograph 04/28/2022  ACCESSION NUMBER(S): [id number]  ORDERING CLINICIAN: JULIA GRIFFIN  TECHNIQUE:  Two views of the left hip were obtained.     FINDINGS:  No sign of acute left hip fracture or dislocation. Left femoroacetabular joint space overall appears maintained. There is a well corticated 13 mm density adjacent to the left greater trochanter on the frogleg lateral view, nonspecific. Calcified pelvic phleboliths are suspected. Intrauterine device overlies the pelvis.     IMPRESSION:  No sign of acute left hip fracture or dislocation. The joint space overall appears maintained.  There is a well corticated 13 mm density adjacent to the left greater trochanter on the frogleg lateral view, nonspecific. This could relate to previous injury or potentially soft tissue calcification.     Dictation workstation:   [id number]    Original Interpreting Physician:   DUGLAS LAZO MD  Original Transcribed by/Date: MMODAL Sep 18 2023  3:09P  --------------------------------------------------------  PROCEDURE:     SPINE LUMBAR WO CONTRAST - TMR  2010  REASON FOR EXAM: LEFT BUTTOCK PAIN  RESULT: MRN: [id number] Patient Name: PRIYANK REHMAN  STUDY: SPINE LUMBAR WO CONTRAST; 9/11/2023 1:32 pm  INDICATION: LEFT BUTTOCK PAIN. /low back pain into right buttock region  COMPARISON: None  ACCESSION NUMBER(S) [id number]  ORDERING CLINICIAN: JULIA GRIFFIN  TECHNIQUE:  Multiple, multiplanar sequences of the lumbar spine were obtained.             FINDINGS:  The normal lumbar lordosis is preserved. The conus terminates at L1-L2.   No acute fracture or spondylolisthesis is identified. Degenerative endplate changes can be seen at L4-L5. Schmorl's node seen no other STIR abnormalities are seen within the marrow. Bone marrow reconversion is noted.             The vertebral bodies are grossly preserved.             T12-L1: Broad-based disc protrusion and facet arthropathy causing no significant canal or foraminal stenosis.  L1-L2: Diffuse disc bulge and facet arthropathy causing no significant canal or foraminal stenosis.  L2-L3: Broad-based disc protrusion, ligamentum flavum hypertrophy and facet arthropathy causing no significant canal or foraminal stenosis.  L3-L4: Right foraminal disc protrusion, ligamentum flavum hypertrophy facet arthropathy causing right lateral recess stenosis no significant   canal stenosis and moderate bilateral foraminal stenosis.  L4-L5: Broad-based disc protrusion and facet arthropathy causing no significant canal stenosis and no left foraminal stenosis but moderate to   severe right foraminal stenosis.  L5-S1: No disc herniation. No significant canal or foraminal stenosis.             IMPRESSION:  1. Degenerative disc disease and spondylosis as above T12-L5  2. Disc bulge L1-L2, L5-S1 (to the LEFT)   3. Disc protrusion T12-L1;  L2-L3;L3-L4; and L4-L5  4. BILATERAL neural foraminal stenosis, moderate L3-L4, moderate to severe RIGHT side L4-L5            Dictation workstation:   [id number]  Original Interpreting Physician:   EMILY EDMONDS MD  Original Transcribed by/Date: JUSTEN Sep 11 2023 12:07P  Original Electronically Signed by/Date: EMILY EDMONDS MD Sep 11 2023    __________________________________________________________________  PROCEDURE: PELVIS WO CONTRAST - TMR 2057  REASON FOR EXAM: LEFT BUTTOCK PAIN  RESULT: CLINICAL HISTORY: Injury playing tennis/left buttock pain and 10 weeks  COMPARISON: X-ray pelvis 4/28/2022  TECHNIQUE: Multiple, multiplanar sequences of the pelvis were obtained.            FINDINGS:  Bilateral acetabular rim osteophytic changes are identified.            Left hamstring origin tendinosis can be seen with mild surrounding edema identified.            No acute fracture or dislocation is seen. There are no MR signs of AVN or significant hip effusion.            Trochanteric bursitis is noted.            The SI joints are intact. Degenerative disc disease and spondylosis can be seen.            Incidental note is made of suspected degenerative tearing of the anterior/superior karon bilaterally.            The marrow is appears grossly normal. The musculature and soft tissues are unremarkable.            MR sequences were not optimized for evaluation of intrapelvic contents. Incidental uterine fibroids are noted. IUD is noted within the endometrial canal.            IMPRESSION:  1. Mild left hamstring origin tendinosis/tendinitis. The semitendinosus, semimembranosus and biceps femoris tendon origins appear intact.  2. No MR findings of internal derangement of the pelvis.  3. Incindental uterine fibroids noted.  4. Trochanteric bursitis is noted.  5. Incidental note is made of suspected degenerative tearing of the anterior/superior karon bilaterally.  6. Degenerative disc disease and spondylosis can be  seen.            This report has been produced using speech recognition.  Original Interpreting Physician: EMILY EDMONDS MD  Original Transcribed by/Date: [facility] May 9 2022 5:10P  ___________________________________  Left leg shorter than right leg by the following:  Iliac crest 6.7 millimeters  Sacral base 3.8 millimeters  Midline femoral head 6.8 millimeters  Median difference of left leg being shorter than right leg is approximately 5.77 millimeters            PROCEDURE: PELVIS 1 OR 2 VIEW - TXR 0039  REASON FOR EXAM: MYALGIA, OTHER SITE  RESULT: EXAM: AP pelvis, one view, weightbearing   CLINICAL HISTORY: Leg length discrepancy            FINDINGS:  No fractures or destructive lesions are identified.   Pelvic ring is intact.   Lumbar spine is tilted to the right suggesting a lumbar dextroscoliosis.   The right femoral head is positioned 7 mm higher then the left.   There is a T-shaped IUD overlying the pelvis.            IMPRESSION:  1. No acute pathologic findings are identified.  2. Right femoral head is positioned 7 mm higher than the left.  3. Probable lumbar dextroscoliosis partially included within the field-of-view.            This report has been produced using speech recognition.  Original Interpreting Physician: MARCY BRITO M.D.  Original Transcribed by/Date: [facility] Apr 28 2022 11:16A                   Imaging and Diagnostics Review:  Left leg shorter than right leg is approximately 5.77 mm       Assessment   1. Pain of left hip  Point of Care Ultrasound      2. Labral tear of hip, degenerative        3. Tendinosis        4. Primary osteoarthritis of left hip        5. Strain of left hamstring muscle, subsequent encounter        6. Piriformis syndrome of left side        7. Low back pain with left-sided sciatica, unspecified back pain laterality, unspecified chronicity        8. Neural foraminal stenosis of lumbosacral spine        9. Dextroscoliosis of lumbosacral spine        10. Spondylosis of  lumbar spine        11. Bulging of intervertebral disc between L4 and L5        12. Sacral region somatic dysfunction        13. Hamstring tightness of both lower extremities        14. Hip flexor tightness, unspecified laterality        15. Protrusion of intervertebral disc of lumbosacral region        16. Left buttock pain        17. Strain of gluteus medius of left lower extremity, subsequent encounter        18. Leg length discrepancy        19. Valgus deformity of both feet            Procedure   Time Out (5 Minutes): Yes  Patient Name: Angeles Valdez  YOB: 1970  Procedure: Prolozone injection #1  Needed Supplies Available: Correct Supplies  Laterality: Left hip  Site Verified and Marked: Correct Site(s) Marked  Timeout Performed by Provider: Dr. Med Espinosa D.O.  Staff Initials: ALZ    Patient is informed and consent has been signed by the patient if over 18 years old., Patient parent and/or legal guardian is informed and consent has been signed., Patient and/or parent and/or legal guardian accept all risks, benefits, and possible complications associated with procedure(s) and/or manipulation(s) and/or injection(s)., Patient and/or parent and/or legal guardian deny patient allergy or known complications with any of the medications, instruments, products, and/or techniques used., All questions and concerns were answered and/or addressed with the patient and/or parent and/or legal guardian., The patient and/or parent and/or legal guardian agree to proceed with the procedure(s) and/or manipulation(s) and/or injection(s)., Prep: The area was cleansed with a mixture of equal parts rubbing alcohol 70% and Hibclens., Utilizing clean technique, the injection site(s) were marked with a skin marker., and Injection site(s) were anesthestized with topical analgesic spray prior to injection.    Performed as a separate, cash-based service regenerative Prolozone injection #1 into the patients Left hip,  under ultrasound.    Prolozone mixture of:, 2mL Lidocaine 2% (NDC: 5401-0751-12 LOT#: [id number] EXP: 01/01/25), 0.1mL Sodium Bicarbonate 8.4% (NDC: 0068-0085-63  LOT#: -[id number] EXP: 06/01/24), 1mL Vitamin B12 (NDC: 67984-453-83; LOT#: [id number] EXP: 08/31/24) , 0.1mL Folic Acid (NDC: 29398-157-46; LOT#: [id number] EXP: 06/30/24), 0.1mL Vitamin B Complex (NDC: 25119-943-71; LOT#: [id number] EXP: 06/30/25), 0.6mL 50% Dextrose (NDC: [address]-8; LOT#: [id number] EXP: 05/31/25) , 0.1mL Traumeel (NO NDC), 2mL Biocean Marine Plasma (NO NDC) , and  followed by injection of Ozone 20mg/ml without removing the needle    Band-aid and/or compressive bandage was placed over the injection site(s). After the injection(s) performed osteopathic manipulation therapy to the affected area(s) to help increase blood flow to aid with the healing process as well as circulation of the medication. The patient tolerated the procedure well without any complications. The patient was instructed to gently massage the treatment site(s) as well as to apply moist heat to the affected area(s). Additionally, the patient was instructed to contact the office immediately with any complications or concerns.    The ScribeStephanie, and Nurse, Evelyn, were present in the room during the entire procedure.    The following discharge instructions were reviewed in detail with the patient to the level of their understanding:    PAIN:  A mild to moderate amount of discomfort, tenderness, stiffness, and achiness-caused by the inflammation of the area can be expected for a few days after the injection. This is normal and good as the inflammation is an important part of the healing process.    SKIN: Due to the numbing spray used, you may develop a red, itchy, scaly rash in the area that was sprayed. Should your skin become itchy, wash the area with mild soap and warm water. If needed, you may apply topical over-the-counter Hydrocortisone cream to alleviate any itchiness. AVOID  scratching due to risk of infection.,     BATHING/SWIMMING: You may shower after your procedure with regular soap and water, but no baths, no swimming, and no hot tubs for 3-4 days after the procedure.,     ACTIVITIES:  Immediately following the injection, you may resume daily activities (such as work) and light exercise. We suggest that you refrain from strenuous activity and heavy lifting, particularly the injured body part, for a week post-procedure, but sometimes this can change as well.    MOIST HEAT/ICE:  Moist heat may be used on the injection area. NO ICE.  MEDICATION: You may continue your prescribed medications and any over-the-counter supplements; however, it is not recommended to use aspirin or anti-inflammatories (Motrin, Advil, Aleve, Ibuprofen, Naproxen etc.) for up to 2 weeks post procedure. Tylenol Extra Strength, Tylenol Arthritis and/or any prescribed narcotics or muscle relaxants are OK to take.    APPOINTMENTS: You should have a follow-up appointment with Dr. Espinosa scheduled 1-3 weeks as recommended after your procedure for your next round of injections or to follow-up after you have completed your injection series. Please schedule your follow-up appointment on your way out after your procedure, or call the office to schedule these appointments as soon as possible.    PRECAUTIONS: Smoking, caffeine, alcohol, sex and anti-inflammatories post-procedure may reduce the effectiveness of Prolotherapy/Neural Prolotherapy/Prolozone injections. Early, rare post procedure problems that can be concerning are as follows: an increase in pain not relieved by medication (over the counter or prescription), a temperature above 101 degrees, bleeding or progressive, extreme swelling, or numbness.     CALL THE OFFICE OR GO TO THE EMERGENCY ROOM IF ANY OF THESE SYMPTOMS OCCUR.   If you have any questions or problems, please call our office at [phone]      Treatment or Intervention:  May  only use moist heat  since doing regenerative inflammatory injections    Continue Physical Therapy 1-2 times a week for 8-10 weeks with manual therapy as well as dry needling and IASTM with lumbar traction  Once again, reviewed home exercises and flexibility exercises to be performed by the patient routinely  Once again, Stressed the importance of wearing shoes with good stability control to help with the biomechanics affecting the lower legs  Once again, stressed the importance of wearing full foot insoles to help with the biomechanics affecting the lower legs    Once again, recommendation over-the-counter  vitamin-D 2 -3000+ milligrams a day, as directed daily to promote bony healing, in addition to a daily multivitamin.    Once again, recommendation to continue over-the-counter curcumin, turmeric, boswellia, as well as egg shell membrane as directed to aid with joint inflammation.    Once again, recommendation to continue over-the-counter Move Free for joint health   stressed the importance of only using OTC Tylenol Extra Strength or OTC Tylenol Arthritis, taking one every 6-8 hours with food as needed.     also stressed the importance of staying away from NSAIDs since doing regenerative inflammatory injections  Patient advised regarding the risks and/or potential adverse reactions and/or side effects of any prescribed medications along with any over-the-counter medications or any supplements used. Patient advised to seek immediate medical care if any adverse reactions occur. The patient and/or patient(s) parent(s) verbalized their understanding.    Once again, discussed in detail with the patient to the level of their understanding the possibility in the future will perform OMT through the hip flexor(s) as well as associated musculature including the aDductors, pectineus, obturator, and gamellus.     continue6 millimeter heel lift  in the LEFT shoe to accommodate for leg length discrepancy found on standing erect pelvis xray     Performed Prolozone #1 injection into LEFT hip under ultrasound The patient tolerated the procedure well and without any adverse effects. Discharge instructions for REGENERATIVE PROLOZONE injections were reviewed in detail with the patient to the level of their understanding; a copy of these instructions were provided to the patient at the time of this office visit.   Follow-up next week for PRP injection #1 into the patients LEFT hip or sooner as needed.    Stephanie RAINES, attest this documentation has been prepared under the direction and in the presence of Omar Espinosa D.O. By signing below, Med RAINES D.O, personally performed the services described in this documentation. All medical record entries made by the scribe were at my direction and in my presence. I have reviewed the chart and agree that the record reflects my personal performance and is accurate and complete. Please note that this report has been partially produced using speech recognition software. It may contain errors related to grammar, punctuation or spelling. Electronically signed, but not reviewed. Med Espinosa D.O. Director of Sports Medicine.     MED ESPINOSA on 1/8/24 at 1:51 PM.     Med Espinosa DO, FAOASM

## 2024-01-08 ENCOUNTER — OFFICE VISIT (OUTPATIENT)
Dept: SPORTS MEDICINE | Facility: CLINIC | Age: 54
End: 2024-01-08
Payer: COMMERCIAL

## 2024-01-08 VITALS
WEIGHT: 126 LBS | DIASTOLIC BLOOD PRESSURE: 68 MMHG | HEART RATE: 66 BPM | SYSTOLIC BLOOD PRESSURE: 102 MMHG | HEIGHT: 65 IN | BODY MASS INDEX: 20.99 KG/M2

## 2024-01-08 DIAGNOSIS — M48.07 NEURAL FORAMINAL STENOSIS OF LUMBOSACRAL SPINE: ICD-10-CM

## 2024-01-08 DIAGNOSIS — M67.80 TENDINOSIS: ICD-10-CM

## 2024-01-08 DIAGNOSIS — M51.36 BULGING OF INTERVERTEBRAL DISC BETWEEN L4 AND L5: ICD-10-CM

## 2024-01-08 DIAGNOSIS — M41.87 DEXTROSCOLIOSIS OF LUMBOSACRAL SPINE: ICD-10-CM

## 2024-01-08 DIAGNOSIS — S76.312D STRAIN OF LEFT HAMSTRING MUSCLE, SUBSEQUENT ENCOUNTER: ICD-10-CM

## 2024-01-08 DIAGNOSIS — M62.9 HAMSTRING TIGHTNESS OF BOTH LOWER EXTREMITIES: ICD-10-CM

## 2024-01-08 DIAGNOSIS — M79.18 LEFT BUTTOCK PAIN: ICD-10-CM

## 2024-01-08 DIAGNOSIS — M24.559 HIP FLEXOR TIGHTNESS, UNSPECIFIED LATERALITY: ICD-10-CM

## 2024-01-08 DIAGNOSIS — M21.70 LEG LENGTH DISCREPANCY: ICD-10-CM

## 2024-01-08 DIAGNOSIS — M16.12 PRIMARY OSTEOARTHRITIS OF LEFT HIP: ICD-10-CM

## 2024-01-08 DIAGNOSIS — M99.04 SACRAL REGION SOMATIC DYSFUNCTION: ICD-10-CM

## 2024-01-08 DIAGNOSIS — S76.012D STRAIN OF GLUTEUS MEDIUS OF LEFT LOWER EXTREMITY, SUBSEQUENT ENCOUNTER: ICD-10-CM

## 2024-01-08 DIAGNOSIS — G57.02 PIRIFORMIS SYNDROME OF LEFT SIDE: ICD-10-CM

## 2024-01-08 DIAGNOSIS — M25.552 PAIN OF LEFT HIP: ICD-10-CM

## 2024-01-08 DIAGNOSIS — M54.42 LOW BACK PAIN WITH LEFT-SIDED SCIATICA, UNSPECIFIED BACK PAIN LATERALITY, UNSPECIFIED CHRONICITY: ICD-10-CM

## 2024-01-08 DIAGNOSIS — M24.159 LABRAL TEAR OF HIP, DEGENERATIVE: ICD-10-CM

## 2024-01-08 DIAGNOSIS — Q66.6 VALGUS DEFORMITY OF BOTH FEET: ICD-10-CM

## 2024-01-08 DIAGNOSIS — M47.816 SPONDYLOSIS OF LUMBAR SPINE: ICD-10-CM

## 2024-01-08 DIAGNOSIS — M51.27 PROTRUSION OF INTERVERTEBRAL DISC OF LUMBOSACRAL REGION: ICD-10-CM

## 2024-01-08 PROCEDURE — 99214 OFFICE O/P EST MOD 30 MIN: CPT | Performed by: FAMILY MEDICINE

## 2024-01-08 PROCEDURE — 1036F TOBACCO NON-USER: CPT | Performed by: FAMILY MEDICINE

## 2024-01-08 ASSESSMENT — LIFESTYLE VARIABLES
HOW OFTEN DO YOU HAVE A DRINK CONTAINING ALCOHOL: NEVER
HOW MANY STANDARD DRINKS CONTAINING ALCOHOL DO YOU HAVE ON A TYPICAL DAY: PATIENT DOES NOT DRINK
SKIP TO QUESTIONS 9-10: 1
AUDIT-C TOTAL SCORE: 0
HOW OFTEN DO YOU HAVE SIX OR MORE DRINKS ON ONE OCCASION: NEVER

## 2024-01-08 ASSESSMENT — ENCOUNTER SYMPTOMS
DEPRESSION: 0
OCCASIONAL FEELINGS OF UNSTEADINESS: 0
LOSS OF SENSATION IN FEET: 0

## 2024-01-08 ASSESSMENT — PATIENT HEALTH QUESTIONNAIRE - PHQ9
2. FEELING DOWN, DEPRESSED OR HOPELESS: NOT AT ALL
SUM OF ALL RESPONSES TO PHQ9 QUESTIONS 1 AND 2: 0
1. LITTLE INTEREST OR PLEASURE IN DOING THINGS: NOT AT ALL

## 2024-01-08 NOTE — PATIENT INSTRUCTIONS
The following discharge instructions were reviewed in detail with the patient to the level of their understanding:    PAIN:  A mild to moderate amount of discomfort, tenderness, stiffness, and achiness-caused by the inflammation of the area can be expected for a few days after the injection. This is normal and good as the inflammation is an important part of the healing process.    SKIN: Due to the numbing spray used, you may develop a red, itchy, scaly rash in the area that was sprayed. Should your skin become itchy, wash the area with mild soap and warm water. If needed, you may apply topical over-the-counter Hydrocortisone cream to alleviate any itchiness. AVOID scratching due to risk of infection.,     BATHING/SWIMMING: You may shower after your procedure with regular soap and water, but no baths, no swimming, and no hot tubs for 3-4 days after the procedure.,     ACTIVITIES:  Immediately following the injection, you may resume daily activities (such as work) and light exercise. We suggest that you refrain from strenuous activity and heavy lifting, particularly the injured body part, for a week post-procedure, but sometimes this can change as well.    MOIST HEAT/ICE:  Moist heat may be used on the injection area. NO ICE.  MEDICATION: You may continue your prescribed medications and any over-the-counter supplements; however, it is not recommended to use aspirin or anti-inflammatories (Motrin, Advil, Aleve, Ibuprofen, Naproxen etc.) for up to 2 weeks post procedure. Tylenol Extra Strength, Tylenol Arthritis and/or any prescribed narcotics or muscle relaxants are OK to take.    APPOINTMENTS: You should have a follow-up appointment with Dr. Espinosa scheduled 1-3 weeks as recommended after your procedure for your next round of injections or to follow-up after you have completed your injection series. Please schedule your follow-up appointment on your way out after your procedure, or call the office to schedule these  appointments as soon as possible.    PRECAUTIONS: Smoking, caffeine, alcohol, sex and anti-inflammatories post-procedure may reduce the effectiveness of Prolotherapy/Neural Prolotherapy/Prolozone injections. Early, rare post procedure problems that can be concerning are as follows: an increase in pain not relieved by medication (over the counter or prescription), a temperature above 101 degrees, bleeding or progressive, extreme swelling, or numbness.     CALL THE OFFICE OR GO TO THE EMERGENCY ROOM IF ANY OF THESE SYMPTOMS OCCUR.   If you have any questions or problems, please call our office at [phone]      Treatment or Intervention:  May use to alternate ice and moist heat as needed    Continue Physical Therapy 1-2 times a week for 8-10 weeks with manual therapy as well as dry needling and IASTM with lumbar traction  Once again, reviewed home exercises and flexibility exercises to be performed by the patient routinely  Once again, Stressed the importance of wearing shoes with good stability control to help with the biomechanics affecting the lower legs  Once again, stressed the importance of wearing full foot insoles to help with the biomechanics affecting the lower legs    Once again, recommendation over-the-counter  vitamin-D 2 -3000+ milligrams a day, as directed daily to promote bony healing, in addition to a daily multivitamin.    Once again, recommendation to continue over-the-counter curcumin, turmeric, boswellia, as well as egg shell membrane as directed to aid with joint inflammation.    Once again, recommendation to continue over-the-counter Move Free for joint health  May continue to take Advil Liquid Gels and  the patient may alternate with OTC Tylenol Extra Strength or OTC Tylenol Arthritis, taking one every 6-8 hours with food as needed.  Patient advised regarding the risks and/or potential adverse reactions and/or side effects of any prescribed medications along with any over-the-counter  medications or any supplements used. Patient advised to seek immediate medical care if any adverse reactions occur. The patient and/or patient(s) parent(s) verbalized their understanding.    Once again, discussed in detail with the patient to the level of their understanding the possibility in the future of regenerative injections versus corticosteroid injections    Once again, discussed in detail with the patient to the level of their understanding the possibility in the future will perform OMT through the hip flexor(s) as well as associated musculature including the aDductors, pectineus, obturator, and gamellus.    Patient given appropriate 6 millimeter heel lift to be placed in the LEFT shoe to accommodate for leg length discrepancy found on standing erect pelvis xray   Received Prolozone #1 injection into LEFT hip under ultrasoundThe patient tolerated the procedure well and without any adverse effects. Discharge instructions for REGENERATIVE PROLOZONE injections were reviewed in detail with the patient to the level of their understanding; a copy of these instructions were provided to the patient at the time of this office visit.   Follow-up next week for PRP injection #1 into the patients LEFT hip or sooner as needed.

## 2024-01-11 ENCOUNTER — TELEPHONE (OUTPATIENT)
Dept: SPORTS MEDICINE | Facility: CLINIC | Age: 54
End: 2024-01-11
Payer: COMMERCIAL

## 2024-01-11 NOTE — TELEPHONE ENCOUNTER
Per Dr. Jesús FIERRO to Valium 10mg Take one tablet 40-45 minutes prior to procedure then take one tablet 20-25 min prior to procedure. Contacted patient and requested she come to the office tomorrow to sign consent forms prior to medication called into pharmacy. Patient agreed.

## 2024-01-11 NOTE — TELEPHONE ENCOUNTER
Patient called and left message requesting a prescription for Valium or Diazepam to take prior to her PRP procedure next week. She reports heightened anxiety regarding the procedure. Please advise.

## 2024-01-12 NOTE — TELEPHONE ENCOUNTER
Patient arrived today to sign Opiod Consent. Rescheduled her procedure next week. Contacted patient's pharmacy ([facility] in [place]) and left message with prescription details.    #) Hypoglycemia medication induced  - Downgrade to telemetry  - No Hypoglycemic episodes for 24 hour  - Check fingerstick q4hr  - Cont oral feeding.  - Hold all oral hypoglycemic agents  - Pt is hyperglycemic now, will start Insulin 12/4-4-4  - Endocrinology consult pending    #) Bradycardia  - Decrease metoprolol to 50mg daily  - Monitor BP and HR    #) Hx Afib  - Cont Metoprolol and Xarelto   - HR controlled at this time    #) hx Congestive Heart failure diastolic dysfunction  - Recent admission for Decompensated HF, back to baseline now  - Cont Lasix, Aspirin and losartan    #COPD  - c/w breo and Duoneb PRN    #HTN  - c/w imdur, lasix, losartan and metoprolol  - tele monitoring     DM  - c/w carb consistnet  - will keep pt on basal bolus regimen    BPH  - tamsulosin    #Activitiy: OOBC  #DIet; carb consistent fluid restricted  #DVT/GI PPX: Xarelto/protonix  #Dispo: PT/R onboard. Consultant pending  #Code; Full. Possible d/c tomorrow #) Hypoglycemia medication induced  - Downgrade to telemetry  - No Hypoglycemic episodes for 24 hour  - Check fingerstick q4hr  - Cont oral feeding.  - Hold all oral hypoglycemic agents  - Pt is hyperglycemic now, will start Insulin 12/4-4-4  - Endocrinology consult pending    #) Bradycardia  - Decrease metoprolol to 50mg daily  - Monitor BP and HR    #) Hx Afib  - Cont Metoprolol and Xarelto   - HR controlled at this time    #) hx Chronic Congestive Heart failure diastolic dysfunction  - Recent admission for Decompensated HF, back to baseline now  - Cont Lasix, Aspirin and losartan    #COPD  - c/w breo and Duoneb PRN    #HTN  - c/w imdur, lasix, losartan and metoprolol  - tele monitoring     DM  - c/w carb consistnet  - will keep pt on basal bolus regimen    BPH  - tamsulosin    #Activitiy: OOBC  #DIet; carb consistent fluid restricted  #DVT/GI PPX: Xarelto/protonix  #Dispo: PT/R onboard. Consultant pending  #Code; Full. Possible d/c tomorrow

## 2024-01-15 NOTE — PROGRESS NOTES
Verbal consent of the patient and/or verbal parental consent for patients under the age of 18 have been obtained to conduct a physical examination at this office visit.    Established patient  History Of Present Illness  01/16/24 Angeles Valdez is a 53 y.o. female who presents for PRP #1 injection into the patients LEFT Hip. She reports less frequent and less intense pain since her last injection. The patient still experiences pain to the anterior and posterior LEFT hip which radiates into her glutes. She also states she has IT band tightness which she feels laterally down her leg at times. Her pain today is 2/10 aching diffusely which will exacerbate with exercise especially when she is running more than a 10k. The patient will treat her pain with Tylenol when needed. Angeles continues to wear good supportive footwear with full foot inserts and takes OTC curcumin and Move Free supplementation. She also purchased an inversion table which she has started using. She is hopeful that the injections can help her to avoid surgical intervention at this time.  overall she says she definitely felt a difference after the Prolozone injection her pain is decreased and her range of motion did improve.  She did not have the excruciating pain in the posterior hip area like she did previously.    Last Recorded Vitals  There were no vitals taken for this visit.     All previous Progress Notes and imaging results related to this patients chief complaint have been reviewed in preparation for this examination/procedure    Past Medical History  She has a past medical history of Fibroid and Migraines.    Surgical History  She has a past surgical history that includes Breast surgery (09/21/2020); Cervical biopsy w/ loop electrode excision; Colonoscopy; [place] tooth extraction; Lipoma resection (Left); and Urethra surgery.     Social History  She reports that she has quit smoking. Her smoking use included cigarettes. She has never used  smokeless tobacco. She reports that she does not drink alcohol and does not use drugs.    Family History  No family history on file.     Allergies  Animal dander    Historical Clinical Intake  April 28, 2022--- Verbal consent of the patient and/or verbal parental consent for patients under the age of 18 have been obtained to conduct a physical examination at this office visit. This 51 year old female presents today for evaluation of left buttock/leg pain. She was injured approximately 2 months ago while playing tennis. She was sprinting to the net and she felt a pop in the posterior aspect of left buttock and thigh and will radiate down her left leg into her foot. Denies any numbness/tingling. Denies any pain with walking, standing, laying down. Pain is worse with sitting. She has been using ice, rest, moist heat, stretching with no relief in symptoms. She has been using OTC Tylenol and NSAIDS with no relief in pain. She has been using a cervical pillow to sit on and has been using a tennis ball to apply pressure to her left pirformis/buttock. She is flying to [place] tomorrow and is hoping for a possible cortisone injection. Pain is currently 8/10 with sititng and is burning in nature.  ---------------------  May 17, 2022 Above note reviewed. Verbal consent of the patient and/or verbal parental consent for patients under the age of 18 have been obtained to conduct a physical examination at this office visit. Angeles is here for a reevaluation and to review the MRI of her PELVIS. She states that she did get mild relief of symptoms while of Prednisone, but relief was short-lived. She continues to c/o pain LEFT proximal hamstring at insertion especially when sitting. She continues to have to put a golf ball under this area when she is sitting it seems to help alleviate the pain. She also has multiple trigger points around that hamstring area and in her gluteal area. She rates pain currently at 4/10, but it can get  up to 8/10 after prolonged sitting. She is not currently in PT as she has been out of the state traveling, but is wearing her insoles and shoes with good support as directed previously. In addition, she is taking Flexiril as needed for pain.  -----------------------  January 3, 2023 Above note reviewed. Verbal consent of the patient and/or verbal parental consent for patients under the age of 18 have been obtained to conduct a physical examination at this office visit. Angeles, an established patient, is here for a reevaluation of her left hip/leg/buttock pain. Angeles did not complete physical therapy for her injury. She states she is experiencing increased pain. She states it is difficult to sit for an extended amount of time. She says her pain is is present in her left lateral hip and buttock and is radiating down her leg all the way to her ankle. Angeles describes her pain as aching and burning. She states she has been using Tylenol and OTC NSAIDs with no relief. She rates her pain today as 4/10.  -----------------------  September 18, 2023 Above note reviewed. Verbal consent of the patient and/or verbal parental consent for patients under the age of 18 have been obtained to conduct a physical examination at this office visit. Angeles is an established patient who presents today for a reevaluation of her LUMBAR spine and review of her MRI. She states that overall she is feeling significantly better but continues to get soreness in LEFT posterior hip that is alleviated by putting pressure there. She denies any numnbess/tingling in either leg. She states that she will run 30 miles per week. Pain is currently 1/10 at rest. She additionally complains of some pain that gets worse in her posterior hip. She says it feels different than the pain that shoots down her leg off and on. She says that it only hurts after running for long periods of time. We talked in detail about crosstraining and switching stuff up as she  "gets older with different exercises and activities. We also talked in detail about regenerative injections once again and she definitely is considering regenerative injections in the hip because she would like to try to avoid surgical intervention.  ----------------------------  12/5/23 Angeles Valdez is a 53 y.o. female who presents to review her LEFT hip MR Arthrogram. She continues to perform her home exercises previously learned in Physical Therapy. She describes her left hip pain as an aching, burning sensation that initiates in the left side of her low back and extends into her left IT band. She reports over the weekend she was running and felt pain diffusely throughout her left knee citing it felt as though it has \"seized\" up on her, but she kept running through the pain. She rates her pain at 2/10 today with mild improvement overall. She takes OTC Ibuprofen for pain management as needed with mild, temporary relief. Additionally, she takes OTC curcumin and Move Free as previously recommended for joint health and wears recommended supportive footwear. She states that going up and down stairs causes her pain. After she was done running this weekend she experienced numbness and tingling going down her leg.  We talked in detail about her MR arthrogram results and that it is definitely possibility that the labral tear is affecting some of the stuff in her hip but also she has some gluteal tendinosis that is interacting with her proximal portion of her tensor fascia monika and is also referring stuff down her leg as well as probably from her lumbar spine with her disc bulges and protrusions.  ------------------------------  01/08/24 Angeles Valdez is a 53 y.o. female who presents for regenerative Prolozone injection #1 into her LEFT hip. She continues to perform her home strengthening and stretching exercises previously learned in Physical Therapy in addition to exercising in the gym routinely. She describes her pain " as an ache occurring posteriorly proximal to her left SI joint with radiculopathy extending into her LLE. She denies any numbness or tingling at this time. She rates her pain at 3/10 today. She is not currently utilizing any pa in management interventions at this time.     Review of Systems  Review of Systems:  CONSTITUTIONAL:   Negative for weight change, loss of appetite, fatigue, weakness, fever, chills, night sweats, headaches .           HEENT:   Negative for cold, cough, sore throat, sinus pain, swollen lymph nodes.           OPHTHALMOLOGY:   Negative for diminished vision, blurred vision, loss of vision, double vision.           ALLERGY:   Negative for runny nose, scratchy throat, sinus congestion, rash, facial pressure, nasal congestion, post-nasal drip.           CARDIOLOGY:   Negative for chest pain, palpitations, murmurs, irregular heart beat, shortness of breath, leg edema, dyspnea on exertion, fatigue, dizziness.           RESPIRATORY:   Negative for chest pain, shortness of breath, swelling of the legs, asthma/copd, chest congestion, pain with breathing .           GASTROENTEROLOGY:   Negative for nausea, vomitting, heartburn, constipation, diarrhea, blood in stool, change in bowel habits, black stool.           HEMATOLOGY/LYMPH:   Negative for fatigue, loss of appetitie, easy bruising, easy bleeding, anemia, abnormal bleeding, slow healing.           ENDOCRINOLOGY:   Negative for polyuria, polydipsia, polyphagia, fatigue, weight loss, weight gain, cold intolerance, heat intolerance, diabetes.           MUSCULOSKELETAL:   Positive  for LEFT hip pain         DERMATOLOGY:   Negative for rash, bruising.           NEUROLOGY:   Negative for tingling, numbness, gait abnormality, paresthesias, weakness, sciatica.          General Examination:  GENERAL APPEARANCE: Appears well, pleasant, and cooperative, NAD.   HEENT: Normal, unremarkable.   NECK: Supple.   HEART: RRR, normal S1S2.   LUNGS: Clear to  auscultation bilaterally.   ABDOMEN: Soft, NT/ND, BS present.   EXTREMITIES: No cyanosis, clubbing.   SKIN: No rash or cellulitis.   NEUROLOGIC EXAM: Awake, A&O x 3, CN's II-XII grossly intact.   PSYCH: Good eye contact, appropriate mood and affect        The scribe, Stephanie, was present in the room during the entire visit including, but not limited to the physical examination!.     General (SPINE):  Location:  LUMBAR.   Erythema:  Negative.   Edema:  Negative.   Effusion:  Negative.   TART Findings: POSITIVE:,     Still some Tissue Texture Changes, Asymmetry, Restriction, Tenderness.  Lower lumbar spine on the left  Warmth:  Negative.   Ecchymosis/Bruising:  Negative.   Percussion Test (LUMBAR):  Negative.   Tuning Fork Test (LUMBAR):  Negative.   Percussion (Sacrum):  Negative.   Tuning Fork (Sacrum):  Negative.   Abrasions:  Negative.   Orientation (LUMBAR):  Symmetrical.   Orientation (Sacrum):  Negative.   ROM (LUMBAR):  FROM: Forward Flexion, Extension, Lateral Bending (Side Bending), and Twisting (Rotation).   ROM (Sacrum):  FROM: Sacral Flexion and Sacral Extension.      Muscle Strength:  POSITIVE:, Improved  +4-+5+5 Hamstring Flexion  +5/+5 Quadricep Extension  +5/+5 Hip Flexion  +4-+5+5 Hip Extension  +4-+5/+5 Hip ABduction toward body  +5/+5 Hip ADduction away from body  +5/+5 Hip Internal Rotation at 90 Degrees  +5/+5 Hip External Rotation at 90 Degrees  +5/+5 Hip Internal Rotation at 0 Degrees  +5/+5 Hip External Rotation at 0 Degrees.             DTR/Neurological: +2/+4 Patellar Reflex (L-4)  +2/+4 Posterior Tibialis and Medial Hamstrings Reflex (L5)  +2/+4 Achilles Reflex (S-1).      Sensation/Neurological Lumbar: Negative, Sensation Intact, 2-Point Discrimination Negative  L1: Low back, hips, and groin  L2: Low back and front of inside of upper leg/thigh  L3: Low back and front of upper leg/thigh  L4: Low back, front of upper leg/thigh, front of lower leg/calf, front of medial area of knee, and inside  of ankle  L5: Low back, front and lateral knee, front and outside of lower leg/calf, top and bottom of foot and first four toes especially big toe.     Sensation/Neurological Sacrum: Negative, Sensation Intact, 2 -Point Discrimination Test: Negative  S1: low back, back of upper leg/thigh, back and inside of lower leg, calf and little toe  S2: Buttocks, genitals, back of upper leg/thigh and calves  S3: Buttocks and genitals  S4: Buttocks  S5: Buttocks.      Sensation/Neurological Coccygeal: Negative, Sensation Intact, 2-Point Discrimination: Negative  Coccyx: Buttocks and area of tailbone.      Palpation: Positive, Tenderness to Palpation LEFT PROXIMAL IT band throughout, greater trochanter, common hamstring tendon and hip flexor       Vascular: Capillary Refill < 2 seconds  +2/+4Carotid  +2/+4 Dorsalis Pedis  +2/+4 Posterior Tibial.      Feet/Foot BILATERAL Valgus Foot.                      Low Back-Disc Injury: All findings improved somewhat since last visit    Valsalva Maneuver: Negative .   Lhermitte's Sign: Negative .   Fermoral Nerve Traction Test:  Positive,  LEFT,  Slump Test:  Positive:  LEFT,  Cross Test Seated: Negative .   Seated Straight Leg Raise:  Positive:  LEFT,  Lying Straight Leg Raise Test:  Positive:   LEFT.   Laseague Sign: Negative .   Laseague Differential Test: Negative.   Laseague Drop Test: Negative .   Seated Laseague Test: Negative .   Reverse Laseague Test: Negative .   Bonnet Piriformis Test: Positive:     Bragard Test: Negative .   Duchenne Trendelenberg Test: Negative .   Kernig-Brudzinski Test: Negative .   Tip Toe Heel Walking Test: Negative .   Carina Prone Knee Flexion Test: Negative .         Low Back-Hip:All findings improved somewhat since last visit  and last injection  Juan David/CARYN Test: Positive:   FADIR Test:  Positive:   Iliolumbar Ligament Test: Negative.   Sacrospinous and SI Ligament Test: Negative .   Sacrotuberal Ligament Test: Negative .   Psoas Sign: Positive:          Low Back-Sciatica:  Wilson Test: Negative.      Low Back-SI Joint:  Three-Phase Hyperextension Test: Negative .   Spine Test: Negative .   Yeoman Test: Negative .   Josie Test: Negative .   Sacroilliac Stress Test: Negative.   Abduction Stress Test: Negative .      Low Back-Spondy: All findings improved somewhat since last visit  Stork Test: Positive:    Sphinx Test: Positive:    Modified Sphinx Test: Positive:         Hip/Pelvis - Sacrum:  Leg Length Supine: Positive: ,  LEFT shorter than Right .   Leg Length Supine to Seated (Derbolowsky Sign):  Positive:  Standing Flexion Test:  Positive: LEFT .   Seated Flexion Test:  Positive: , LEFT.   Spring Test: Negative .   Sacral Somatic Dysfunction: Positive: Right rotation on Right axis .   Sacroiliac (SI) Joint Fixation Test: Negative .   Hip Flexor Tightness:  Positive: , Positive:   >Right   Hamstring Tightness:  Positive: , LEFT. > Right.   All findings improved somewhat since last visit     General (HIP/PELVIS):All findings improved somewhat since last visit  and last injection  Location:  LEFT Hip .   Erythema:  Negative.   Edema: Negative  Effusion:  Negative.   Warmth:  Negative.   Ecchymosis/Bruising: Negative.   Percussion Test:  Negative.   Tuning Fork Test:  Negative.   Abrasions:  Negative.   Orientation:  Symmetrical.   ROM:  Positive: , Still slightly decreased All findings improved somewhat since last visit  and last injection  Internal Rotation (30-35 degrees) Causes pain  External Rotation (45-60 degrees) Causes pain  Flexion (120 degrees) Causes pain  Extension (15-30 degrees)Causes pain  ABduction (45-50 degrees) Causes pain  ADduction (20-30 degrees) Causes pain  FROM Sacral Flexion and Sacral Extension.      Muscle Strength:Positive: All findings improved somewhat since last visit  and last injection  +5/+5 Hamstring Flexion  +5/+5 Quadricep Extension  +4-+5/+5 Hip Flexion Causes pain  +4-+5/+5 Hip Extension Causes painn  +4-+5/+5 Hip  ABduction toward body Causes pain  +4-+5/+5 Hip ADduction away from body Causes pain  +4-+5/+5 Hip Internal Rotation at 90 Degrees Causes pain  +4-+5/+5 Hip External Rotation at 90 Degrees Causes pain  +4-+5/+5 Hip Internal Rotation at 0 Degrees Causes pain  +4-+5/+5 Hip External Rotation at 0 Degrees Causes pain.                   DTR/Neurological: Sensation Intact, 2-Point Discrimination: Negative.   Palpation: Positive:  still some mild tender to palpation in the following areas:  hip flexor, as well as ADDuctors; Pectineus; Glutes around hip joint; and IT Band proximal tensor fascia latae.  All findings improved somewhat since last visit  and last injection  Vascular:  Negative: Normal Pulses , +2/+4, , Femoral Artery, DP, PT, and Capillary Refill < 2 seconds.      Function Tests: All findings improved somewhat since last visit  Test for Rectus Femoris Contracture: Positive:All findings improved somewhat since last visit  and last injection  Hip Extension Test: Positive:  Iliotibial Tract Test: Positive:   Hunter :  Positive:   Hunter Test:  Positive:   Juan Test: Positive:        Hip/Pelvis - Flexibility: All findings improved somewhat since last visit  and last injection  Hamstring Positive:   Hip Flexor  Positive:.   IT-Band  Positive:        Hip/Pelvis - Hip Contractures:  Finger Tip Test: Negative.   Hunter Test: Negative.   Hunter  Test: Negative.   Daniel Sign: Negative.   Piriformis Test 1: Negative.   Piriformis Test 2: Negative.      Hip/Pelvis - Hip Dysplasia:  Trochanter Irritation Sign: Negative.   Galeazzi Test: Negative.    Kalchschmidt Test: Negative.      Hip/Pelvis - Impingement/Labrum: All findings improved somewhat since last visit  and last injection  CARYN Test:  Positive: FADIR Test:  Positive:  Hip Scouring Test:  Positive:  Arauz's Test:  Positive:   Posterior Labrum (Posterior Margin) Test: Positive:   Posterior Impingement (Posterior Labrum) [Torque] Test:  Positive:.    Anterior Impingement (Anterior Labrum) Test:  Positive:        Hip/Pelvis - IT Band Syndrome:  Juan's (Iliotibial Tract) Test: Negative.   Jose Antonio Compression Test: Negative.   J-Sign: Negative.      Hip/Pelvis - SFE:  Drehmann Test: Negative.      Hip/Pelvis - Trochanteric/Pelvis Muscle Function:  Trendelenburg/Duchenne Sign: Negative.   Duchenne Sign: Negative.                                   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  COMPARISON: MRI pelvis 05/09/2022.     ACCESSION NUMBER(S): [id number]  ORDERING CLINICIAN: JULIA GRIFFIN      TECHNIQUE: Multiplanar multisequence MRI of the  left hip was performed after  intra-articular administration of gadolinium based contrast.      FINDINGS:  Bones/Marrow:  No evidence of fracture, dislocation, or contusion.  Bone marrow signal intensity is within normal limits.      Labrum:  Diffuse intrasubstance irregularity and contrast imbibition  into the anterosuperior acetabular labrum.      Joint: No significant femoroacetabular articular cartilage defect.  The hip joint is distended by contrast.      Bursa:  No trochanteric bursitis.      Tendons:  The rectus femoris and iliopsoas tendons are intact. Mild  gluteus minimus and medius insertional tendinosis. The hamstring  tendon origins are unremarkable.      Muscles:  Normal signal intensity and bulk.      Nerves:  The sciatic nerve bundles are normal in appearance.      Internal organs/other:  Limited evaluation of the internal organs of  the pelvis does not demonstrate a focal abnormality, although note is  made that this study is tailored for evaluation of the  musculoskeletal structures of the pelvis.      IMPRESSION:  Anterosuperior acetabular labral tear.      2. Mild gluteus minimus and medius insertional tendinosis.     I personally reviewed the images/study  and I agree with the findings  as stated. This study was interpreted at [facility], [place], [place].      Signed by: Mathew Melendrez 11/21/2023 3:00 PM  Dictation workstation:   [id number]  -----------------------------------------------------------------  STUDY: HIP LT 2 VIEW W OR WO AP PELVIS; 9/18/2023 3:45 pm     INDICATION:  TROCHANTERIC BURSITIS, LEFT HIP. 52-year-old woman with left hip pain  radiating to the buttocks. No recent injury     COMPARISON: Pelvic radiograph 04/28/2022  ACCESSION NUMBER(S): [id number]  ORDERING CLINICIAN: JULIA GRIFFIN  TECHNIQUE:  Two views of the left hip were obtained.     FINDINGS:  No sign of acute left hip fracture or dislocation. Left femoroacetabular joint space overall appears maintained. There is a well corticated 13 mm density adjacent to the left greater trochanter on the frogleg lateral view, nonspecific. Calcified pelvic phleboliths are suspected. Intrauterine device overlies the pelvis.     IMPRESSION:  No sign of acute left hip fracture or dislocation. The joint space overall appears maintained.  There is a well corticated 13 mm density adjacent to the left greater trochanter on the frogleg lateral view, nonspecific. This could relate to previous injury or potentially soft tissue calcification.     Dictation workstation:   [id number]   Original Interpreting Physician:   DUGLAS LAZO MD  Original Transcribed by/Date: MMODAL Sep 18 2023  3:09P  --------------------------------------------------------  PROCEDURE:     SPINE LUMBAR WO CONTRAST - TMR  2010  REASON FOR EXAM: LEFT BUTTOCK PAIN  RESULT: MRN: [id number] Patient Name: PRIYANK REHMAN  STUDY: SPINE LUMBAR WO CONTRAST; 9/11/2023 1:32 pm  INDICATION: LEFT BUTTOCK PAIN. /low back pain into right buttock region  COMPARISON: None  ACCESSION NUMBER(S) [id number]  ORDERING CLINICIAN: JULIA GRIFFIN  TECHNIQUE:  Multiple, multiplanar sequences of the lumbar spine were obtained.              FINDINGS:  The normal lumbar lordosis is preserved. The conus terminates at L1-L2.   No acute fracture or spondylolisthesis is identified. Degenerative endplate changes can be seen at L4-L5. Schmorl's node seen no other STIR abnormalities are seen within the marrow. Bone marrow reconversion is noted.             The vertebral bodies are grossly preserved.             T12-L1: Broad-based disc protrusion and facet arthropathy causing no significant canal or foraminal stenosis.  L1-L2: Diffuse disc bulge and facet arthropathy causing no significant canal or foraminal stenosis.  L2-L3: Broad-based disc protrusion, ligamentum flavum hypertrophy and facet arthropathy causing no significant canal or foraminal stenosis.  L3-L4: Right foraminal disc protrusion, ligamentum flavum hypertrophy facet arthropathy causing right lateral recess stenosis no significant   canal stenosis and moderate bilateral foraminal stenosis.  L4-L5: Broad-based disc protrusion and facet arthropathy causing no significant canal stenosis and no left foraminal stenosis but moderate to   severe right foraminal stenosis.  L5-S1: No disc herniation. No significant canal or foraminal stenosis.             IMPRESSION:  1. Degenerative disc disease and spondylosis as above T12-L5  2. Disc bulge L1-L2, L5-S1 (to the LEFT)   3. Disc protrusion T12-L1; L2-L3;L3-L4; and L4-L5  4. BILATERAL neural foraminal stenosis, moderate L3-L4, moderate to severe RIGHT side L4-L5            Dictation workstation:   [id number]  Original Interpreting Physician:   EMILY EDMONDS MD  Original Transcribed by/Date: MMODAL Sep 11 2023 12:07P  Original Electronically Signed by/Date: EMILY EDMONDS MD Sep 11 2023    __________________________________________________________________  PROCEDURE: PELVIS WO CONTRAST - TMR 2057  REASON FOR EXAM: LEFT BUTTOCK PAIN  RESULT: CLINICAL HISTORY: Injury playing tennis/left buttock pain and 10 weeks  COMPARISON: X-ray pelvis  4/28/2022  TECHNIQUE: Multiple, multiplanar sequences of the pelvis were obtained.            FINDINGS:  Bilateral acetabular rim osteophytic changes are identified.            Left hamstring origin tendinosis can be seen with mild surrounding edema identified.            No acute fracture or dislocation is seen. There are no MR signs of AVN or significant hip effusion.            Trochanteric bursitis is noted.            The SI joints are intact. Degenerative disc disease and spondylosis can be seen.            Incidental note is made of suspected degenerative tearing of the anterior/superior karon bilaterally.            The marrow is appears grossly normal. The musculature and soft tissues are unremarkable.            MR sequences were not optimized for evaluation of intrapelvic contents. Incidental uterine fibroids are noted. IUD is noted within the endometrial canal.            IMPRESSION:  1. Mild left hamstring origin tendinosis/tendinitis. The semitendinosus, semimembranosus and biceps femoris tendon origins appear intact.  2. No MR findings of internal derangement of the pelvis.  3. Incindental uterine fibroids noted.  4. Trochanteric bursitis is noted.  5. Incidental note is made of suspected degenerative tearing of the anterior/superior karon bilaterally.  6. Degenerative disc disease and spondylosis can be seen.            This report has been produced using speech recognition.  Original Interpreting Physician: EMILY EDMONDS MD  Original Transcribed by/Date: [facility] May 9 2022 5:10P  ___________________________________  Left leg shorter than right leg by the following:  Iliac crest 6.7 millimeters  Sacral base 3.8 millimeters  Midline femoral head 6.8 millimeters  Median difference of left leg being shorter than right leg is approximately 5.77 millimeters            PROCEDURE: PELVIS 1 OR 2 VIEW - TXR [id number]  REASON FOR EXAM: MYALGIA, OTHER SITE  RESULT: EXAM: AP pelvis, one view, weightbearing   CLINICAL  HISTORY: Leg length discrepancy            FINDINGS:  No fractures or destructive lesions are identified.   Pelvic ring is intact.   Lumbar spine is tilted to the right suggesting a lumbar dextroscoliosis.   The right femoral head is positioned 7 mm higher then the left.   There is a T-shaped IUD overlying the pelvis.            IMPRESSION:  1. No acute pathologic findings are identified.  2. Right femoral head is positioned 7 mm higher than the left.  3. Probable lumbar dextroscoliosis partially included within the field-of-view.            This report has been produced using speech recognition.  Original Interpreting Physician: MARCY BRITO M.D.  Original Transcribed by/Date: [facility] Apr 28 2022 11:16A        Assessment   No diagnosis found.    Procedure   Time Out (5 Minutes): Yes  Patient Name: Angeles Valdez  YOB: 1970  Procedure: PRP injection #1  Needed Supplies Available: Correct Supplies  Laterality: Left Hip  Site Verified and Marked: Correct Site(s) Marked  Timeout Performed by Provider: Dr. Med Espinosa D.O.  Staff Initials: SLN    Patient is informed and consent has been signed by the patient if over 18 years old., Patient parent and/or legal guardian is informed and consent has been signed., Patient and/or parent and/or legal guardian accept all risks, benefits, and possible complications associated with procedure(s) and/or manipulation(s) and/or injection(s)., Patient and/or parent and/or legal guardian deny patient allergy or known complications with any of the medications, instruments, products, and/or techniques used., All questions and concerns were answered and/or addressed with the patient and/or parent and/or legal guardian., The patient and/or parent and/or legal guardian agree to proceed with the procedure(s) and/or manipulation(s) and/or injection(s)., Prep: The area was cleansed with a mixture of equal parts rubbing alcohol 70% and Hibclens., Utilizing clean technique, the  injection site(s) were marked with a skin marker., and Injection site(s) were anesthestized with topical analgesic spray prior to injection.    Performed regenerative PRP #1 To Continue injection into the patients Left Hip under ultrasound     Site was pre-anesthetized with approximately 11mL of a split solution containinmL of Lidocaine 2% (NDC: 9307-8691-02; LOT #[id number] EXP: 2025)  5mL of Bupivacaine 0.25% (NDC: 2293-9529-22; LOT #: [id number] EXP: 2024)  1mL of Sodium Bicarbonate 8.4%. (NDC: 6302-0726-12; LOT #: [id number] EXP: 2024    The patients Left Hip was then injected with 12 mL of platelet rich plasma (usually the end result contains more than four times blood concentration)    Band-aid and/or compressive bandage was placed over the injection site(s). After the injection(s) performed osteopathic manipulation therapy to the affected area(s) to help increase blood flow to aid with the healing process as well as circulation of the medication. The patient tolerated the procedure well without any complications. The patient was instructed to gently massage the treatment site(s) as well as to apply moist heat to the affected area(s). Additionally, the patient was instructed to contact the office immediately with any complications or concerns.    The , rodger Brown and nurse Crespo, were present in the room during the entire procedure.    The following discharge instructions were reviewed in detail with the patient to the level of their understanding    PAIN:  A mild to moderate amount of discomfort, tenderness, stiffness, and achiness-caused by the inflammation of the area can be expected for a few days after the injection. This is normal and good as the inflammation is an important part of the healing process.    SKIN: Due to the numbing spray used, you may develop a red, scaly, itchy rash in the area that was sprayed. Should your skin become itchy, wash area with mild soap and warm water. If  needed, you may apply topical, over-the-counter Hydrocortisone cream to alleviate any itchiness. AVOID scratching due to risk of infection.    BATHING/SWIMMING: You may shower after your procedure with regular soap and water, but no baths, no swimming, and no hot tubs for 3-4 days after the procedure.    ACTIVITIES:  Immediately following the injection, you may resume daily activities (such as work) and light exercise. We suggest that you refrain from strenuous activity and heavy lifting, particularly the injured body part; for a week post-procedure, but sometimes this can change as well.    MOIST HEAT:  Moist heat may be used on the injection area. NO ICE.    MEDICATION: You may continue your prescribed medications and any over-the-counter supplements; however, it is not recommended to use aspirin or anti-inflammatories (Motrin, Advil, Aleve, Ibuprofen, Naproxen etc.) for up to 2 weeks post treatment. Tylenol Extra Strength, Tylenol Arthritis or any prescribed narcotics or muscle relaxants are OK to take.    APPOINTMENTS: You should have a follow-up appointment with Dr. Espinosa scheduled 1-3 weeks as recommended after your procedure for your next round of injections or to follow-up after you have completed your injection series. Please schedule your follow-up appointment on your way out after your procedure, or call the office to schedule these appointments as soon as possible.    PRECAUTIONS: Smoking, caffeine, alcohol, sex, and anti-inflammatory medications may reduce the effectiveness of PRP injections. Early, rare post procedure problems that can be concerning are as follows: an increase in pain not relieved by medication (over the counter or prescription), a temperature above 101 degrees, bleeding or progressive, extreme swelling, or numbness.     CALL THE OFFICE OR GO TO THE EMERGENCY ROOM IF ANY OF THESE SYMPTOMS OCCUR.   If you have any questions or problems, please call our office at  [phone]      Treatment or Intervention:   May  only use moist heat since doing regenerative inflammatory injections    Continue Physical Therapy  until done  Once again, reviewed home exercises and flexibility exercises to be performed by the patient routinely  Once again, Stressed the importance of wearing shoes with good stability control to help with the biomechanics affecting the lower legs  Once again, stressed the importance of wearing full foot insoles to help with the biomechanics affecting the lower legs    Once again, recommendation  to continue over-the-counter  vitamin-D 2 -3000+ milligrams a day, as directed daily to promote bony healing, in addition to a daily multivitamin.    Once again, recommendation to continue over-the-counter curcumin, turmeric, boswellia, as well as egg shell membrane as directed to aid with joint inflammation.    Once again, recommendation to continue over-the-counter Move Free for joint health   stressed the importance of only using OTC Tylenol Extra Strength or OTC Tylenol Arthritis, taking one every 6-8 hours with food as needed.     also stressed the importance of staying away from NSAIDs since doing regenerative inflammatory injections  Patient advised regarding the risks and/or potential adverse reactions and/or side effects of any prescribed medications along with any over-the-counter medications or any supplements used. Patient advised to seek immediate medical care if any adverse reactions occur. The patient and/or patient(s) parent(s) verbalized their understanding.    Once again, discussed in detail with the patient to the level of their understanding the possibility in the future will perform OMT through the hip flexor(s) as well as associated musculature including the aDductors, pectineus, obturator, and gamellus.     Continue 6 millimeter heel lift  in the LEFT shoe to accommodate for leg length discrepancy found on standing erect pelvis xray    Performed PRP  #1 injection into LEFT hip under ultrasound The patient tolerated the procedure well and without any adverse effects. Discharge instructions for REGENERATIVE PRP and PROLOZONE injections were reviewed in detail with the patient to the level of their understanding; a copy of these instructions were provided to the patient at the time of this office visit.   Follow-up next week for Prolozone injection #2  into the patients LEFT hip or sooner as needed.    KENNA WARD on 1/15/24 at 12:51 PM.     Med Espinosa DO, FAOASM

## 2024-01-16 ENCOUNTER — TELEPHONE (OUTPATIENT)
Dept: SPORTS MEDICINE | Facility: CLINIC | Age: 54
End: 2024-01-16

## 2024-01-16 ENCOUNTER — OFFICE VISIT (OUTPATIENT)
Dept: SPORTS MEDICINE | Facility: CLINIC | Age: 54
End: 2024-01-16
Payer: COMMERCIAL

## 2024-01-16 VITALS
HEART RATE: 72 BPM | BODY MASS INDEX: 20.99 KG/M2 | SYSTOLIC BLOOD PRESSURE: 98 MMHG | WEIGHT: 126 LBS | DIASTOLIC BLOOD PRESSURE: 58 MMHG | HEIGHT: 65 IN

## 2024-01-16 DIAGNOSIS — M21.70 LEG LENGTH DISCREPANCY: ICD-10-CM

## 2024-01-16 DIAGNOSIS — M62.9 HAMSTRING TIGHTNESS OF BOTH LOWER EXTREMITIES: ICD-10-CM

## 2024-01-16 DIAGNOSIS — M51.27 PROTRUSION OF INTERVERTEBRAL DISC OF LUMBOSACRAL REGION: ICD-10-CM

## 2024-01-16 DIAGNOSIS — M47.816 SPONDYLOSIS OF LUMBAR SPINE: ICD-10-CM

## 2024-01-16 DIAGNOSIS — M16.12 PRIMARY OSTEOARTHRITIS OF LEFT HIP: ICD-10-CM

## 2024-01-16 DIAGNOSIS — M48.07 NEURAL FORAMINAL STENOSIS OF LUMBOSACRAL SPINE: ICD-10-CM

## 2024-01-16 DIAGNOSIS — M41.87 DEXTROSCOLIOSIS OF LUMBOSACRAL SPINE: ICD-10-CM

## 2024-01-16 DIAGNOSIS — M67.80 TENDINOSIS: ICD-10-CM

## 2024-01-16 DIAGNOSIS — M99.04 SACRAL REGION SOMATIC DYSFUNCTION: ICD-10-CM

## 2024-01-16 DIAGNOSIS — M24.559 HIP FLEXOR TIGHTNESS, UNSPECIFIED LATERALITY: ICD-10-CM

## 2024-01-16 DIAGNOSIS — M24.159 LABRAL TEAR OF HIP, DEGENERATIVE: ICD-10-CM

## 2024-01-16 DIAGNOSIS — S76.312D STRAIN OF LEFT HAMSTRING MUSCLE, SUBSEQUENT ENCOUNTER: Primary | ICD-10-CM

## 2024-01-16 DIAGNOSIS — Q66.6 VALGUS DEFORMITY OF BOTH FEET: ICD-10-CM

## 2024-01-16 DIAGNOSIS — G57.02 PIRIFORMIS SYNDROME OF LEFT SIDE: ICD-10-CM

## 2024-01-16 DIAGNOSIS — M54.42 LEFT-SIDED LOW BACK PAIN WITH LEFT-SIDED SCIATICA, UNSPECIFIED CHRONICITY: ICD-10-CM

## 2024-01-16 DIAGNOSIS — M79.18 LEFT BUTTOCK PAIN: ICD-10-CM

## 2024-01-16 DIAGNOSIS — M25.552 PAIN OF LEFT HIP: ICD-10-CM

## 2024-01-16 DIAGNOSIS — S76.012D STRAIN OF GLUTEUS MEDIUS OF LEFT LOWER EXTREMITY, SUBSEQUENT ENCOUNTER: ICD-10-CM

## 2024-01-16 PROCEDURE — 99214 OFFICE O/P EST MOD 30 MIN: CPT | Performed by: FAMILY MEDICINE

## 2024-01-16 PROCEDURE — 97035 APP MDLTY 1+ULTRASOUND EA 15: CPT | Performed by: FAMILY MEDICINE

## 2024-01-16 PROCEDURE — 1036F TOBACCO NON-USER: CPT | Performed by: FAMILY MEDICINE

## 2024-01-16 ASSESSMENT — PAIN DESCRIPTION - DESCRIPTORS: DESCRIPTORS: ACHING

## 2024-01-16 ASSESSMENT — PAIN SCALES - GENERAL
PAINLEVEL: 2
PAINLEVEL_OUTOF10: 2

## 2024-01-16 ASSESSMENT — LIFESTYLE VARIABLES
HOW OFTEN DO YOU HAVE SIX OR MORE DRINKS ON ONE OCCASION: NEVER
AUDIT TOTAL SCORE: 0
SKIP TO QUESTIONS 9-10: 1
HOW MANY STANDARD DRINKS CONTAINING ALCOHOL DO YOU HAVE ON A TYPICAL DAY: PATIENT DOES NOT DRINK
HAVE YOU OR SOMEONE ELSE BEEN INJURED AS A RESULT OF YOUR DRINKING: NO
HOW OFTEN DO YOU HAVE A DRINK CONTAINING ALCOHOL: NEVER
HAS A RELATIVE, FRIEND, DOCTOR, OR ANOTHER HEALTH PROFESSIONAL EXPRESSED CONCERN ABOUT YOUR DRINKING OR SUGGESTED YOU CUT DOWN: NO
AUDIT-C TOTAL SCORE: 0

## 2024-01-16 ASSESSMENT — PATIENT HEALTH QUESTIONNAIRE - PHQ9
1. LITTLE INTEREST OR PLEASURE IN DOING THINGS: NOT AT ALL
2. FEELING DOWN, DEPRESSED OR HOPELESS: NOT AT ALL
SUM OF ALL RESPONSES TO PHQ9 QUESTIONS 1 AND 2: 0

## 2024-01-16 ASSESSMENT — ENCOUNTER SYMPTOMS
LOSS OF SENSATION IN FEET: 0
DEPRESSION: 0
OCCASIONAL FEELINGS OF UNSTEADINESS: 0

## 2024-01-16 ASSESSMENT — PAIN - FUNCTIONAL ASSESSMENT: PAIN_FUNCTIONAL_ASSESSMENT: 0-10

## 2024-01-16 NOTE — TELEPHONE ENCOUNTER
Patient requested additional dose of Valium to take immediately prior to her next Prolozone injection on 1/22/24. OK per Dr. Espinosa. Patient and Dr. Espinosa signed Opiod Agreement. Valium 10mg Take one tablet 40-45 minutes prior to procedure then take one tablet 20-25 min prior to procedure. Called in to patient's pharmacy.

## 2024-01-19 NOTE — PROGRESS NOTES
"Verbal consent of the patient and/or verbal parental consent for patients under the age of 18 have been obtained to conduct a physical examination at this office visit.    Established patient  History Of Present Illness  01/22/24 Angeles Valdez is a 53 y.o. female who presents for regenerative Prolozone injection #2 into the patients LEFT hip. She continues to exercise routinely performing home strengthening and stretching exercises she previously learned in Physical Therapy. She reports after her first PRP injection last week she experienced increased aching tightness throughout her left hip x4 days with a feeling of being \"wobbly\". She also notes walking gingerly due to increased discomfort with weakness. She cites the PRP injection was more painful s/p than the prior Prolozone injection, but she is noticing mild improvement overall. She rates her pain at 2/10 today. She takes OTC Tylenol and applies moist heat for pain management with mild, temporary relief.    Last Recorded Vitals  /78   Pulse 62   Ht 1.651 m (5' 5\")   Wt 57.2 kg (126 lb)   BMI 20.97 kg/m²      All previous Progress Notes and imaging results related to this patients chief complaint have been reviewed in preparation for this examination/procedure    Past Medical History  She has a past medical history of Fibroid and Migraines.    Surgical History  She has a past surgical history that includes Breast surgery (09/21/2020); Cervical biopsy w/ loop electrode excision; Colonoscopy; [place] tooth extraction; Lipoma resection (Left); and Urethra surgery.     Social History  She reports that she has quit smoking. Her smoking use included cigarettes. She has never used smokeless tobacco. She reports that she does not drink alcohol and does not use drugs.    Family History  No family history on file.     Allergies  Animal dander    Historical Clinical Intake  April 28, 2022--- Verbal consent of the patient and/or verbal parental consent for " patients under the age of 18 have been obtained to conduct a physical examination at this office visit. This 51 year old female presents today for evaluation of left buttock/leg pain. She was injured approximately 2 months ago while playing tennis. She was sprinting to the net and she felt a pop in the posterior aspect of left buttock and thigh and will radiate down her left leg into her foot. Denies any numbness/tingling. Denies any pain with walking, standing, laying down. Pain is worse with sitting. She has been using ice, rest, moist heat, stretching with no relief in symptoms. She has been using OTC Tylenol and NSAIDS with no relief in pain. She has been using a cervical pillow to sit on and has been using a tennis ball to apply pressure to her left pirformis/buttock. She is flying to [place] tomorrow and is hoping for a possible cortisone injection. Pain is currently 8/10 with sititng and is burning in nature.  ---------------------  May 17, 2022 Above note reviewed. Verbal consent of the patient and/or verbal parental consent for patients under the age of 18 have been obtained to conduct a physical examination at this office visit. Angeles is here for a reevaluation and to review the MRI of her PELVIS. She states that she did get mild relief of symptoms while of Prednisone, but relief was short-lived. She continues to c/o pain LEFT proximal hamstring at insertion especially when sitting. She continues to have to put a golf ball under this area when she is sitting it seems to help alleviate the pain. She also has multiple trigger points around that hamstring area and in her gluteal area. She rates pain currently at 4/10, but it can get up to 8/10 after prolonged sitting. She is not currently in PT as she has been out of the state traveling, but is wearing her insoles and shoes with good support as directed previously. In addition, she is taking Flexiril as needed for pain.  -----------------------  January  3, 2023 Above note reviewed. Verbal consent of the patient and/or verbal parental consent for patients under the age of 18 have been obtained to conduct a physical examination at this office visit. Angeles, an established patient, is here for a reevaluation of her left hip/leg/buttock pain. Angeles did not complete physical therapy for her injury. She states she is experiencing increased pain. She states it is difficult to sit for an extended amount of time. She says her pain is is present in her left lateral hip and buttock and is radiating down her leg all the way to her ankle. Angeles describes her pain as aching and burning. She states she has been using Tylenol and OTC NSAIDs with no relief. She rates her pain today as 4/10.  -----------------------  September 18, 2023 Above note reviewed. Verbal consent of the patient and/or verbal parental consent for patients under the age of 18 have been obtained to conduct a physical examination at this office visit. Angeles is an established patient who presents today for a reevaluation of her LUMBAR spine and review of her MRI. She states that overall she is feeling significantly better but continues to get soreness in LEFT posterior hip that is alleviated by putting pressure there. She denies any numnbess/tingling in either leg. She states that she will run 30 miles per week. Pain is currently 1/10 at rest. She additionally complains of some pain that gets worse in her posterior hip. She says it feels different than the pain that shoots down her leg off and on. She says that it only hurts after running for long periods of time. We talked in detail about crosstraining and switching stuff up as she gets older with different exercises and activities. We also talked in detail about regenerative injections once again and she definitely is considering regenerative injections in the hip because she would like to try to avoid surgical  "intervention.  ----------------------------  12/5/23 Angeles Valdez is a 53 y.o. female who presents to review her LEFT hip MR Arthrogram. She continues to perform her home exercises previously learned in Physical Therapy. She describes her left hip pain as an aching, burning sensation that initiates in the left side of her low back and extends into her left IT band. She reports over the weekend she was running and felt pain diffusely throughout her left knee citing it felt as though it has \"seized\" up on her, but she kept running through the pain. She rates her pain at 2/10 today with mild improvement overall. She takes OTC Ibuprofen for pain management as needed with mild, temporary relief. Additionally, she takes OTC curcumin and Move Free as previously recommended for joint health and wears recommended supportive footwear. She states that going up and down stairs causes her pain. After she was done running this weekend she experienced numbness and tingling going down her leg.  We talked in detail about her MR arthrogram results and that it is definitely possibility that the labral tear is affecting some of the stuff in her hip but also she has some gluteal tendinosis that is interacting with her proximal portion of her tensor fascia monika and is also referring stuff down her leg as well as probably from her lumbar spine with her disc bulges and protrusions.  ------------------------------  01/08/24 Angeles Valdez is a 53 y.o. female who presents for regenerative Prolozone injection #1 into her LEFT hip. She continues to perform her home strengthening and stretching exercises previously learned in Physical Therapy in addition to exercising in the gym routinely. She describes her pain as an ache occurring posteriorly proximal to her left SI joint with radiculopathy extending into her LLE. She denies any numbness or tingling at this time. She rates her pain at 3/10 today. She is not currently utilizing any pa in " management interventions at this time.  ------------------------------  01/16/24 Angeles Valdez is a 53 y.o. female who presents for PRP #1 injection into the patients LEFT Hip. She reports less frequent and less intense pain since her last injection. The patient still experiences pain to the anterior and posterior LEFT hip which radiates into her glutes. She also states she has IT band tightness which she feels laterally down her leg at times. Her pain today is 2/10 aching diffusely which will exacerbate with exercise especially when she is running more than a 10k. The patient will treat her pain with Tylenol when needed. Angeles continues to wear good supportive footwear with full foot inserts and takes OTC curcumin and Move Free supplementation. She also purchased an inversion table which she has started using. She is hopeful that the injections can help her to avoid surgical intervention at this time.  overall she says she definitely felt a difference after the Prolozone injection her pain is decreased and her range of motion did improve.  She did not have the excruciating pain in the posterior hip area like she did previously.     Review of Systems:  CONSTITUTIONAL:   Negative for weight change, loss of appetite, fatigue, weakness, fever, chills, night sweats, headaches .           HEENT:   Negative for cold, cough, sore throat, sinus pain, swollen lymph nodes.           OPHTHALMOLOGY:   Negative for diminished vision, blurred vision, loss of vision, double vision.           ALLERGY:   Negative for runny nose, scratchy throat, sinus congestion, rash, facial pressure, nasal congestion, post-nasal drip.           CARDIOLOGY:   Negative for chest pain, palpitations, murmurs, irregular heart beat, shortness of breath, leg edema, dyspnea on exertion, fatigue, dizziness.           RESPIRATORY:   Negative for chest pain, shortness of breath, swelling of the legs, asthma/copd, chest congestion, pain with breathing .            GASTROENTEROLOGY:   Negative for nausea, vomitting, heartburn, constipation, diarrhea, blood in stool, change in bowel habits, black stool.           HEMATOLOGY/LYMPH:   Negative for fatigue, loss of appetitie, easy bruising, easy bleeding, anemia, abnormal bleeding, slow healing.           ENDOCRINOLOGY:   Negative for polyuria, polydipsia, polyphagia, fatigue, weight loss, weight gain, cold intolerance, heat intolerance, diabetes.           MUSCULOSKELETAL:   Positive  for LEFT hip pain         DERMATOLOGY:   Negative for rash, bruising.           NEUROLOGY:   Negative for tingling, numbness, gait abnormality, paresthesias, weakness, sciatica.          General Examination:  GENERAL APPEARANCE: Appears well, pleasant, and cooperative, NAD.   HEENT: Normal, unremarkable.   NECK: Supple.   HEART: RRR, normal S1S2.   LUNGS: Clear to auscultation bilaterally.   ABDOMEN: Soft, NT/ND, BS present.   EXTREMITIES: No cyanosis, clubbing.   SKIN: No rash or cellulitis.   NEUROLOGIC EXAM: Awake, A&O x 3, CN's II-XII grossly intact.   PSYCH: Good eye contact, appropriate mood and affect        The scribe, Stephanie, was present in the room during the entire visit including, but not limited to the physical examination!.     General (SPINE):  Location:  LUMBAR.   Erythema:  Negative.   Edema:  Negative.   Effusion:  Negative.   TART Findings: POSITIVE: still some Tissue Texture Changes, Asymmetry, Restriction, Tenderness.  Lower lumbar spine on the left  Warmth:  Negative.   Ecchymosis/Bruising:  Negative.   Percussion Test (LUMBAR):  Negative.   Tuning Fork Test (LUMBAR):  Negative.   Percussion (Sacrum):  Negative.   Tuning Fork (Sacrum):  Negative.   Abrasions:  Negative.   Orientation (LUMBAR):  Symmetrical.   Orientation (Sacrum):  Negative.   ROM (LUMBAR):  FROM: Forward Flexion, Extension, Lateral Bending (Side Bending), and Twisting (Rotation).   ROM (Sacrum):  FROM: Sacral Flexion and Sacral Extension.      Muscle  Strength:  POSITIVE: Improved  +5/+5 Hamstring Flexion  +5/+5 Quadricep Extension  +5/+5 Hip Flexion  +5/+5 Hip Extension  +5/+5 Hip ABduction toward body  +5/+5 Hip ADduction away from body  +5/+5 Hip Internal Rotation at 90 Degrees  +5/+5 Hip External Rotation at 90 Degrees  +5/+5 Hip Internal Rotation at 0 Degrees  +5/+5 Hip External Rotation at 0 Degrees.             DTR/Neurological: +2/+4 Patellar Reflex (L-4)  +2/+4 Posterior Tibialis and Medial Hamstrings Reflex (L5)  +2/+4 Achilles Reflex (S-1).      Sensation/Neurological Lumbar: Negative, Sensation Intact, 2-Point Discrimination Negative  L1: Low back, hips, and groin  L2: Low back and front of inside of upper leg/thigh  L3: Low back and front of upper leg/thigh  L4: Low back, front of upper leg/thigh, front of lower leg/calf, front of medial area of knee, and inside of ankle  L5: Low back, front and lateral knee, front and outside of lower leg/calf, top and bottom of foot and first four toes especially big toe.     Sensation/Neurological Sacrum: Negative, Sensation Intact, 2 -Point Discrimination Test: Negative  S1: low back, back of upper leg/thigh, back and inside of lower leg, calf and little toe  S2: Buttocks, genitals, back of upper leg/thigh and calves  S3: Buttocks and genitals  S4: Buttocks  S5: Buttocks.      Sensation/Neurological Coccygeal: Negative, Sensation Intact, 2-Point Discrimination: Negative  Coccyx: Buttocks and area of tailbone.      Palpation: Positive, still some Tenderness to Palpation LEFT PROXIMAL IT band throughout, greater trochanter, common hamstring tendon and hip flexor       Vascular: Capillary Refill < 2 seconds  +2/+4Carotid  +2/+4 Dorsalis Pedis  +2/+4 Posterior Tibial.      Feet/Foot BILATERAL Valgus Foot.                      Low Back-Disc Injury: All findings improved somewhat since last visit    Valsalva Maneuver: Negative .   Lhermitte's Sign: Negative .   Fermoral Nerve Traction Test:  Positive,  LEFT,  Slump  Test:  Positive:  LEFT,  Cross Test Seated: Negative .   Seated Straight Leg Raise:  Positive:  LEFT,  Lying Straight Leg Raise Test:  Positive:   LEFT.   Laseague Sign: Negative .   [place] Differential Test: Negative.   [place] Drop Test: Negative .   Seated L[place]ague Test: Negative .   Reverse [place]aseague Test: Negative .   Bonnet Piriformis Test: Negative     Bragard Test: Negative .   Duchenne Trendelenberg Test: Negative .   Kernig-Brudzinski Test: Negative .   Tip Toe Heel Walking Test: Negative .   Carina Prone Knee Flexion Test: Negative .       Low Back-Hip:All findings improved  since last visit  and last injection  Juan David/CARYN Test: Positive:   FADIR Test:  Positive:   Iliolumbar Ligament Test: Negative.   Sacrospinous and SI Ligament Test: Negative .   Sacrotuberal Ligament Test: Negative .   Psoas Sign: Positive:       Low Back-Sciatica:  Wilson Test: Negative.      Low Back-SI Joint:  Three-Phase Hyperextension Test: Negative .   Spine Test: Negative .   Yeoman Test: Negative .   Josie Test: Negative .   Sacroilliac Stress Test: Negative.   Abduction Stress Test: Negative .      Low Back-Spondy: All findings improved somewhat since last visit  Stork Test: Positive:    Sphinx Test: Positive:    Modified Sphinx Test: Positive:       Hip/Pelvis - Sacrum:  Leg Length Supine: Positive: ,  LEFT shorter than Right .   Leg Length Supine to Seated (Derbolowsky Sign):  Positive:  Standing Flexion Test:  Positive: LEFT .   Seated Flexion Test:  Positive: , LEFT.   Spring Test: Negative .   Sacral Somatic Dysfunction: Positive: Right rotation on Right axis .   Sacroiliac (SI) Joint Fixation Test: Negative .   Hip Flexor Tightness:  Positive: , Positive:   >Right   Hamstring Tightness:  Positive: , LEFT. > Right.   All findings improved somewhat since last visit         General (HIP/PELVIS):All findings improved somewhat since last visit  and last injection  Location:  LEFT Hip .   Erythema:  Negative.    Edema: Negative  Effusion:  Negative.   Warmth:  Negative.   Ecchymosis/Bruising: Negative.   Percussion Test:  Negative.   Tuning Fork Test:  Negative.   Abrasions:  Negative.   Orientation:  Symmetrical.   ROM:  Positive: , Still slightly decreased All findings improved  since last visit  and last injection  Internal Rotation (30-35 degrees) Causes  minimal pain  External Rotation (45-60 degrees) Causes  minimal pain  Flexion (120 degrees) Causes  minimal pain  Extension (15-30 degrees)Causes  minimal pain  ABduction (45-50 degrees) Causes  minimal pain  ADduction (20-30 degrees) Causes  minimal pain  FROM Sacral Flexion and Sacral Extension.      Muscle Strength:Positive: All findings improved since last visit  and last injection  +5/+5 Hamstring Flexion  +5/+5 Quadricep Extension  +4-+5/+5 Hip Flexion Causes  minimal pain  +4-+5/+5 Hip Extension Causes  minimal pain  +4-+5/+5 Hip ABduction toward body Causes  minimal pain  +4-+5/+5 Hip ADduction away from body Causes  minimal pain  +4-+5/+5 Hip Internal Rotation at 90 Degrees Causes  minimal pain  +4-+5/+5 Hip External Rotation at 90 Degrees Causes  minimal pain  +4-+5/+5 Hip Internal Rotation at 0 Degrees Causes  minimal pain  +4-+5/+5 Hip External Rotation at 0 Degrees Causes  minimal pain                DTR/Neurological: Sensation Intact, 2-Point Discrimination: Negative.   Palpation: Positive:  still some very mild tender to palpation in the following areas:  hip flexor, as well as ADDuctors; Pectineus; Glutes around hip joint; and IT Band proximal tensor fascia latae.  All findings improved  since last visit  and last injection  Vascular:  Negative: Normal Pulses , +2/+4, , Femoral Artery, DP, PT, and Capillary Refill < 2 seconds.      Function Tests: All findings improved  since last visit  Test for Rectus Femoris Contracture: Positive:All findings improved  since last visit  and last injection  Hip Extension Test: Positive:  Iliotibial Tract Test:  Positive:   Hunter :  Positive:   Hunter Test:  Positive:   Juan Test: Positive:      Hip/Pelvis - Flexibility: All findings improved  since last visit  and last injection  Hamstring Positive:   Hip Flexor  Positive:.   IT-Band  Positive:      Hip/Pelvis - Hip Contractures:  Finger Tip Test: Negative.   Hunter Test: Negative.   Hunter  Test: Negative.   Daniel Sign: Negative.   Piriformis Test 1: Negative.   Piriformis Test 2: Negative.      Hip/Pelvis - Hip Dysplasia:  Trochanter Irritation Sign: Negative.   Galeazzi Test: Negative.    Kalchschmidt Test: Negative.      Hip/Pelvis - Impingement/Labrum: All findings improved  since last visit  and last injection  CARYN Test:  Positive: FADIR Test:  Positive:  Hip Scouring Test:  Positive:  Arauz's Test:  Positive:   Posterior Labrum (Posterior Margin) Test: Positive:   Posterior Impingement (Posterior Labrum) [Torque] Test:  Positive:.   Anterior Impingement (Anterior Labrum) Test:  Positive:      Hip/Pelvis - IT Band Syndrome:  Juan's (Iliotibial Tract) Test: Negative.   Jose Antonio Compression Test: Negative.   J-Sign: Negative.      Hip/Pelvis - SFE:  Drehmann Test: Negative.      Hip/Pelvis - Trochanteric/Pelvis Muscle Function:  Trendelenburg/Duchenne Sign: Negative.   Duchenne Sign: Negative.                                       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  COMPARISON: MRI pelvis 05/09/2022.     ACCESSION NUMBER(S): [id number]  ORDERING CLINICIAN: JULIA GRIFFIN      TECHNIQUE: Multiplanar multisequence MRI of the  left hip was performed after intra-articular administration of gadolinium based contrast.      FINDINGS:  Bones/Marrow:  No evidence of fracture, dislocation, or contusion. Bone marrow signal intensity is within normal limits.      Labrum:  Diffuse intrasubstance irregularity and  contrast imbibition into the anterosuperior acetabular labrum.      Joint: No significant femoroacetabular articular cartilage defect. The hip joint is distended by contrast.      Bursa:  No trochanteric bursitis.      Tendons:  The rectus femoris and iliopsoas tendons are intact. Mild gluteus minimus and medius insertional tendinosis. The hamstring  tendon origins are unremarkable.      Muscles:  Normal signal intensity and bulk.      Nerves:  The sciatic nerve bundles are normal in appearance.      Internal organs/other:  Limited evaluation of the internal organs of the pelvis does not demonstrate a focal abnormality, although note is  made that this study is tailored for evaluation of the musculoskeletal structures of the pelvis.      IMPRESSION:  1. Anterosuperior acetabular labral tear.    2. Mild gluteus minimus and medius insertional tendinosis.     I personally reviewed the images/study and I agree with the findings as stated. This study was interpreted at [place], [place].      Signed by: Mathew Melendrez 11/21/2023 3:00 PM  Dictation workstation:   [id number]  -----------------------------------------------------------------  STUDY: HIP LT 2 VIEW W OR WO AP PELVIS; 9/18/2023 3:45 pm   INDICATION: TROCHANTERIC BURSITIS, LEFT HIP. 52-year-old woman with left hip pain radiating to the buttocks. No recent injury   COMPARISON: Pelvic radiograph 04/28/2022    ACCESSION NUMBER(S): [id number]  ORDERING CLINICIAN: JULIA GRIFFIN    TECHNIQUE: Two views of the left hip were obtained.     FINDINGS:  No sign of acute left hip fracture or dislocation. Left femoroacetabular joint space overall appears maintained. There is a well corticated 13 mm density adjacent to the left greater trochanter on the frogleg lateral view, nonspecific. Calcified pelvic phleboliths are suspected. Intrauterine device overlies the pelvis.     IMPRESSION:  No sign of acute left hip fracture or  dislocation. The joint space overall appears maintained.  There is a well corticated 13 mm density adjacent to the left greater trochanter on the frogleg lateral view, nonspecific. This could relate to previous injury or potentially soft tissue calcification.     Dictation workstation:   [id number]   Original Interpreting Physician:   DUGLAS LAZO MD  Original Transcribed by/Date: MMODAL Sep 18 2023  3:09P  --------------------------------------------------------  PROCEDURE:     SPINE LUMBAR WO CONTRAST - TMR  2010  REASON FOR EXAM: LEFT BUTTOCK PAIN  RESULT: MRN: [id number] Patient Name: PRIYANK REHMAN  STUDY: SPINE LUMBAR WO CONTRAST; 9/11/2023 1:32 pm  INDICATION: LEFT BUTTOCK PAIN. /low back pain into right buttock region  COMPARISON: None    ACCESSION NUMBER(S) [id number]  ORDERING CLINICIAN: JULIA GRIFFIN    TECHNIQUE: Multiple, multiplanar sequences of the lumbar spine were obtained.             FINDINGS:  The normal lumbar lordosis is preserved. The conus terminates at L1-L2.   No acute fracture or spondylolisthesis is identified. Degenerative endplate changes can be seen at L4-L5. Schmorl's node seen no other STIR abnormalities are seen within the marrow. Bone marrow reconversion is noted.             The vertebral bodies are grossly preserved.             T12-L1: Broad-based disc protrusion and facet arthropathy causing no significant canal or foraminal stenosis.  L1-L2: Diffuse disc bulge and facet arthropathy causing no significant canal or foraminal stenosis.  L2-L3: Broad-based disc protrusion, ligamentum flavum hypertrophy and facet arthropathy causing no significant canal or foraminal stenosis.  L3-L4: Right foraminal disc protrusion, ligamentum flavum hypertrophy facet arthropathy causing right lateral recess stenosis no significant   canal stenosis and moderate bilateral foraminal stenosis.  L4-L5: Broad-based disc protrusion and facet arthropathy causing no significant canal stenosis and no left  foraminal stenosis but moderate to   severe right foraminal stenosis.  L5-S1: No disc herniation. No significant canal or foraminal stenosis.             IMPRESSION:  1. Degenerative disc disease and spondylosis as above T12-L5  2. Disc bulge L1-L2, L5-S1 (to the LEFT)   3. Disc protrusion T12-L1; L2-L3;L3-L4; and L4-L5  4. BILATERAL neural foraminal stenosis, moderate L3-L4, moderate to severe RIGHT side L4-L5            Dictation workstation:   [id number]  Original Interpreting Physician:   EMILY EDMONDS MD  Original Transcribed by/Date: MMODAL Sep 11 2023 12:07P  Original Electronically Signed by/Date: EMILY EDMONDS MD Sep 11 2023    __________________________________________________________________  PROCEDURE: PELVIS WO CONTRAST - TMR 2057  REASON FOR EXAM: LEFT BUTTOCK PAIN  RESULT: CLINICAL HISTORY: Injury playing tennis/left buttock pain and 10 weeks  COMPARISON: X-ray pelvis 4/28/2022  TECHNIQUE: Multiple, multiplanar sequences of the pelvis were obtained.            FINDINGS:  Bilateral acetabular rim osteophytic changes are identified.            Left hamstring origin tendinosis can be seen with mild surrounding edema identified.            No acute fracture or dislocation is seen. There are no MR signs of AVN or significant hip effusion.            Trochanteric bursitis is noted.            The SI joints are intact. Degenerative disc disease and spondylosis can be seen.            Incidental note is made of suspected degenerative tearing of the anterior/superior karon bilaterally.            The marrow is appears grossly normal. The musculature and soft tissues are unremarkable.            MR sequences were not optimized for evaluation of intrapelvic contents. Incidental uterine fibroids are noted. IUD is noted within the endometrial canal.            IMPRESSION:  1. Mild left hamstring origin tendinosis/tendinitis. The semitendinosus, semimembranosus and biceps femoris tendon origins appear  intact.  2. No MR findings of internal derangement of the pelvis.  3. Incindental uterine fibroids noted.  4. Trochanteric bursitis is noted.  5. Incidental note is made of suspected degenerative tearing of the anterior/superior karon bilaterally.  6. Degenerative disc disease and spondylosis can be seen.            This report has been produced using speech recognition.  Original Interpreting Physician: EMILY EDMONDS MD  Original Transcribed by/Date: [facility] May 9 2022 5:10P  ___________________________________  Left leg shorter than right leg by the following:  Iliac crest 6.7 millimeters  Sacral base 3.8 millimeters  Midline femoral head 6.8 millimeters  Median difference of left leg being shorter than right leg is approximately 5.77 millimeters            PROCEDURE: PELVIS 1 OR 2 VIEW - TXR 0039  REASON FOR EXAM: MYALGIA, OTHER SITE  RESULT: EXAM: AP pelvis, one view, weightbearing   CLINICAL HISTORY: Leg length discrepancy            FINDINGS:  No fractures or destructive lesions are identified.   Pelvic ring is intact.   Lumbar spine is tilted to the right suggesting a lumbar dextroscoliosis.   The right femoral head is positioned 7 mm higher then the left.   There is a T-shaped IUD overlying the pelvis.            IMPRESSION:  1. No acute pathologic findings are identified.  2. Right femoral head is positioned 7 mm higher than the left.  3. Probable lumbar dextroscoliosis partially included within the field-of-view.            This report has been produced using speech recognition.  Original Interpreting Physician: MARCY BRITO M.D.  Original Transcribed by/Date: [facility] Apr 28 2022 11:16A         Assessment   1. Pain of left hip        2. Primary osteoarthritis of left hip  Point of Care Ultrasound      3. Labral tear of hip, degenerative        4. Left-sided low back pain with left-sided sciatica, unspecified chronicity        5. Strain of left hamstring muscle, subsequent encounter        6. Strain of gluteus  medius of left lower extremity, subsequent encounter        7. Left buttock pain        8. Sacral region somatic dysfunction        9. Tendinosis        10. Dextroscoliosis of lumbosacral spine        11. Piriformis syndrome of left side        12. Spondylosis of lumbar spine        13. Protrusion of intervertebral disc of lumbosacral region        14. Neural foraminal stenosis of lumbosacral spine        15. Low back pain with left-sided sciatica, unspecified back pain laterality, unspecified chronicity        16. Bulging of intervertebral disc between L4 and L5        17. Hamstring tightness of both lower extremities        18. Hip flexor tightness, unspecified laterality        19. Leg length discrepancy        20. Valgus deformity of both feet            Procedure   Time Out (5 Minutes): Yes  Patient Name: Angeles Valdez  YOB: 1970  Procedure: Prolozone injection #2  Needed Supplies Available: Correct Supplies  Laterality: Left hip  Site Verified and Marked: Correct Site(s) Marked  Timeout Performed by Provider: Dr. Med Espinosa D.O.  Staff Initials: ALZ    Patient is informed and consent has been signed by the patient if over 18 years old., Patient parent and/or legal guardian is informed and consent has been signed., Patient and/or parent and/or legal guardian accept all risks, benefits, and possible complications associated with procedure(s) and/or manipulation(s) and/or injection(s)., Patient and/or parent and/or legal guardian deny patient allergy or known complications with any of the medications, instruments, products, and/or techniques used., All questions and concerns were answered and/or addressed with the patient and/or parent and/or legal guardian., The patient and/or parent and/or legal guardian agree to proceed with the procedure(s) and/or manipulation(s) and/or injection(s)., Prep: The area was cleansed with a mixture of equal parts rubbing alcohol 70% and Hibclens., Utilizing  clean technique, the injection site(s) were marked with a skin marker., and Injection site(s) were anesthestized with topical analgesic spray prior to injection.    Performed as a separate, cash-based service regenerative Prolozone injection #2 into the patients Left hip, under ultrasound.    Prolozone mixture of:, 2mL Lidocaine 2% (NDC: 1469-2491-42 LOT#: [id number] EXP: 01/01/25), 0.1mL Sodium Bicarbonate 8.4% (NDC: 5236-5761-72  LOT#: [id number] EXP: 06/01/24), 1mL Vitamin B12 (NDC: 53361-605-00; LOT#: [id number] EXP: 08/31/24) , 0.1mL Folic Acid (NDC: 97922-873-95; LOT#: [id number] EXP: 06/30/24), 0.1mL Vitamin B Complex (NDC: 10670-835-62; LOT#: [id number] EXP: 06/30/25), 0.6mL 50% Dextrose (NDC: [address]-9; LOT#: [id number] EXP: 05/31/25) , 1.7mL Biocean Marine Plasma (NO NDC), and  followed by injection of Ozone 20mg/ml without removing the needle    Band-aid and/or compressive bandage was placed over the injection site(s). After the injection(s) performed osteopathic manipulation therapy to the affected area(s) to help increase blood flow to aid with the healing process as well as circulation of the medication. The patient tolerated the procedure well without any complications. The patient was instructed to gently massage the treatment site(s) as well as to apply moist heat to the affected area(s). Additionally, the patient was instructed to contact the office immediately with any complications or concerns.    The Scribe, Stephanie, and Nurse, Evelyn, were present in the room during the entire procedure.    The following discharge instructions were reviewed in detail with the patient to the level of their understanding:    PAIN:  A mild to moderate amount of discomfort, tenderness, stiffness, and achiness-caused by the inflammation of the area can be expected for a few days after the injection. This is normal and good as the inflammation is an important part of the healing process.    SKIN: Due to the numbing spray used, you  may develop a red, itchy, scaly rash in the area that was sprayed. Should your skin become itchy, wash the area with mild soap and warm water. If needed, you may apply topical over-the-counter Hydrocortisone cream to alleviate any itchiness. AVOID scratching due to risk of infection.,     BATHING/SWIMMING: You may shower after your procedure with regular soap and water, but no baths, no swimming, and no hot tubs for 3-4 days after the procedure.,     ACTIVITIES:  Immediately following the injection, you may resume daily activities (such as work) and light exercise. We suggest that you refrain from strenuous activity and heavy lifting, particularly the injured body part, for a week post-procedure, but sometimes this can change as well.    MOIST HEAT/ICE:  Moist heat may be used on the injection area. NO ICE.  MEDICATION: You may continue your prescribed medications and any over-the-counter supplements; however, it is not recommended to use aspirin or anti-inflammatories (Motrin, Advil, Aleve, Ibuprofen, Naproxen etc.) for up to 2 weeks post procedure. Tylenol Extra Strength, Tylenol Arthritis and/or any prescribed narcotics or muscle relaxants are OK to take.    APPOINTMENTS: You should have a follow-up appointment with Dr. Espinosa scheduled 1-3 weeks as recommended after your procedure for your next round of injections or to follow-up after you have completed your injection series. Please schedule your follow-up appointment on your way out after your procedure, or call the office to schedule these appointments as soon as possible.    PRECAUTIONS: Smoking, caffeine, alcohol, sex and anti-inflammatories post-procedure may reduce the effectiveness of Prolotherapy/Neural Prolotherapy/Prolozone injections. Early, rare post procedure problems that can be concerning are as follows: an increase in pain not relieved by medication (over the counter or prescription), a temperature above 101 degrees, bleeding or progressive,  extreme swelling, or numbness.     CALL THE OFFICE OR GO TO THE EMERGENCY ROOM IF ANY OF THESE SYMPTOMS OCCUR.   If you have any questions or problems, please call our office at [phone]      Treatment or Intervention:  May  only use moist heat since doing regenerative inflammatory injections    Continue Physical Therapy  until complete  Once again, reviewed home exercises and flexibility exercises to be performed by the patient routinely  Once again, Stressed the importance of wearing shoes with good stability control to help with the biomechanics affecting the lower legs  Once again, stressed the importance of wearing full foot insoles to help with the biomechanics affecting the lower legs    Once again, recommendation  to continue over-the-counter  vitamin-D 2 -3000+ milligrams a day, as directed daily to promote bony healing, in addition to a daily multivitamin.    Once again, recommendation to continue over-the-counter curcumin, turmeric, boswellia, as well as egg shell membrane as directed to aid with joint inflammation.    Once again, recommendation to continue over-the-counter Move Free for joint health  Stressed the importance of only using OTC Tylenol Extra Strength or OTC Tylenol Arthritis, taking one every 6-8 hours with food as needed.    Also stressed the importance of staying away from NSAIDs since doing regenerative inflammatory injections  Patient advised regarding the risks and/or potential adverse reactions and/or side effects of any prescribed medications along with any over-the-counter medications or any supplements used. Patient advised to seek immediate medical care if any adverse reactions occur. The patient and/or patient(s) parent(s) verbalized their understanding.    Once again, discussed in detail with the patient to the level of their understanding the possibility in the future will perform OMT through the hip flexor(s) as well as associated musculature including the aDductors,  pectineus, obturator, and gamellus.    Continue to wear recommended 6 millimeter heel lift  in the LEFT shoe to accommodate for leg length discrepancy found on standing erect pelvis xray   Performed Prolozone #2 injection into the patients LEFT hip under ultrasound The patient tolerated the procedure well and without any adverse effects. Discharge instructions for REGENERATIVE PROLOZONE injections were reviewed in detail with the patient to the level of their understanding; a copy of these instructions were provided to the patient at the time of this office visit.   Follow-up in 4-6 weeks for Prolozone #3 into the patients LEFT hip or sooner as needed. Please note that this report has been produced using speech recognition software.  It may contain errors related to grammar, punctuation or spelling.  Electronically signed, but not reviewed.  ROMAN James, Director of Sports Medicine     JULIA GRIFFIN on 1/22/24 at 11:02 AM.     OMAR James DO

## 2024-01-22 ENCOUNTER — OFFICE VISIT (OUTPATIENT)
Dept: SPORTS MEDICINE | Facility: CLINIC | Age: 54
End: 2024-01-22
Payer: COMMERCIAL

## 2024-01-22 VITALS
WEIGHT: 126 LBS | DIASTOLIC BLOOD PRESSURE: 78 MMHG | HEART RATE: 62 BPM | BODY MASS INDEX: 20.99 KG/M2 | SYSTOLIC BLOOD PRESSURE: 110 MMHG | HEIGHT: 65 IN

## 2024-01-22 DIAGNOSIS — M62.9 HAMSTRING TIGHTNESS OF BOTH LOWER EXTREMITIES: ICD-10-CM

## 2024-01-22 DIAGNOSIS — S76.012D STRAIN OF GLUTEUS MEDIUS OF LEFT LOWER EXTREMITY, SUBSEQUENT ENCOUNTER: ICD-10-CM

## 2024-01-22 DIAGNOSIS — M79.18 LEFT BUTTOCK PAIN: ICD-10-CM

## 2024-01-22 DIAGNOSIS — M25.552 PAIN OF LEFT HIP: ICD-10-CM

## 2024-01-22 DIAGNOSIS — M24.159 LABRAL TEAR OF HIP, DEGENERATIVE: ICD-10-CM

## 2024-01-22 DIAGNOSIS — M21.70 LEG LENGTH DISCREPANCY: ICD-10-CM

## 2024-01-22 DIAGNOSIS — S76.312D STRAIN OF LEFT HAMSTRING MUSCLE, SUBSEQUENT ENCOUNTER: ICD-10-CM

## 2024-01-22 DIAGNOSIS — M99.04 SACRAL REGION SOMATIC DYSFUNCTION: ICD-10-CM

## 2024-01-22 DIAGNOSIS — M54.42 LOW BACK PAIN WITH LEFT-SIDED SCIATICA, UNSPECIFIED BACK PAIN LATERALITY, UNSPECIFIED CHRONICITY: ICD-10-CM

## 2024-01-22 DIAGNOSIS — M48.07 NEURAL FORAMINAL STENOSIS OF LUMBOSACRAL SPINE: ICD-10-CM

## 2024-01-22 DIAGNOSIS — M51.27 PROTRUSION OF INTERVERTEBRAL DISC OF LUMBOSACRAL REGION: ICD-10-CM

## 2024-01-22 DIAGNOSIS — M47.816 SPONDYLOSIS OF LUMBAR SPINE: ICD-10-CM

## 2024-01-22 DIAGNOSIS — M67.80 TENDINOSIS: ICD-10-CM

## 2024-01-22 DIAGNOSIS — M54.42 LEFT-SIDED LOW BACK PAIN WITH LEFT-SIDED SCIATICA, UNSPECIFIED CHRONICITY: ICD-10-CM

## 2024-01-22 DIAGNOSIS — M16.12 PRIMARY OSTEOARTHRITIS OF LEFT HIP: ICD-10-CM

## 2024-01-22 DIAGNOSIS — Q66.6 VALGUS DEFORMITY OF BOTH FEET: ICD-10-CM

## 2024-01-22 DIAGNOSIS — M51.36 BULGING OF INTERVERTEBRAL DISC BETWEEN L4 AND L5: ICD-10-CM

## 2024-01-22 DIAGNOSIS — M41.87 DEXTROSCOLIOSIS OF LUMBOSACRAL SPINE: ICD-10-CM

## 2024-01-22 DIAGNOSIS — G57.02 PIRIFORMIS SYNDROME OF LEFT SIDE: ICD-10-CM

## 2024-01-22 DIAGNOSIS — M24.559 HIP FLEXOR TIGHTNESS, UNSPECIFIED LATERALITY: ICD-10-CM

## 2024-01-22 PROCEDURE — 1036F TOBACCO NON-USER: CPT | Performed by: FAMILY MEDICINE

## 2024-01-22 PROCEDURE — 99214 OFFICE O/P EST MOD 30 MIN: CPT | Performed by: FAMILY MEDICINE

## 2024-01-22 PROCEDURE — 97035 APP MDLTY 1+ULTRASOUND EA 15: CPT | Performed by: FAMILY MEDICINE

## 2024-01-22 ASSESSMENT — LIFESTYLE VARIABLES
HOW OFTEN DURING THE LAST YEAR HAVE YOU FAILED TO DO WHAT WAS NORMALLY EXPECTED FROM YOU BECAUSE OF DRINKING: NEVER
HAS A RELATIVE, FRIEND, DOCTOR, OR ANOTHER HEALTH PROFESSIONAL EXPRESSED CONCERN ABOUT YOUR DRINKING OR SUGGESTED YOU CUT DOWN: NO
HOW OFTEN DO YOU HAVE SIX OR MORE DRINKS ON ONE OCCASION: NEVER
HOW OFTEN DURING THE LAST YEAR HAVE YOU BEEN UNABLE TO REMEMBER WHAT HAPPENED THE NIGHT BEFORE BECAUSE YOU HAD BEEN DRINKING: NEVER
HOW OFTEN DURING THE LAST YEAR HAVE YOU HAD A FEELING OF GUILT OR REMORSE AFTER DRINKING: NEVER
AUDIT TOTAL SCORE: 1
HOW OFTEN DURING THE LAST YEAR HAVE YOU NEEDED AN ALCOHOLIC DRINK FIRST THING IN THE MORNING TO GET YOURSELF GOING AFTER A NIGHT OF HEAVY DRINKING: NEVER
AUDIT-C TOTAL SCORE: 1
HOW OFTEN DURING THE LAST YEAR HAVE YOU FOUND THAT YOU WERE NOT ABLE TO STOP DRINKING ONCE YOU HAD STARTED: NEVER
HOW MANY STANDARD DRINKS CONTAINING ALCOHOL DO YOU HAVE ON A TYPICAL DAY: 1 OR 2
HAVE YOU OR SOMEONE ELSE BEEN INJURED AS A RESULT OF YOUR DRINKING: NO
SKIP TO QUESTIONS 9-10: 1
HOW OFTEN DO YOU HAVE A DRINK CONTAINING ALCOHOL: MONTHLY OR LESS

## 2024-01-22 ASSESSMENT — PAIN DESCRIPTION - DESCRIPTORS: DESCRIPTORS: ACHING;TIGHTNESS

## 2024-01-22 ASSESSMENT — PAIN SCALES - GENERAL
PAINLEVEL_OUTOF10: 2
PAINLEVEL: 2

## 2024-01-22 ASSESSMENT — PAIN - FUNCTIONAL ASSESSMENT: PAIN_FUNCTIONAL_ASSESSMENT: 0-10

## 2024-01-26 ENCOUNTER — APPOINTMENT (OUTPATIENT)
Dept: UROLOGY | Facility: CLINIC | Age: 54
End: 2024-01-26
Payer: COMMERCIAL

## 2024-03-11 ENCOUNTER — APPOINTMENT (OUTPATIENT)
Dept: SPORTS MEDICINE | Facility: CLINIC | Age: 54
End: 2024-03-11
Payer: COMMERCIAL

## 2024-03-18 ENCOUNTER — APPOINTMENT (OUTPATIENT)
Dept: SPORTS MEDICINE | Facility: CLINIC | Age: 54
End: 2024-03-18
Payer: COMMERCIAL

## 2025-03-17 ENCOUNTER — HOSPITAL ENCOUNTER (OUTPATIENT)
Dept: RADIOLOGY | Facility: HOSPITAL | Age: 55
Discharge: HOME | End: 2025-03-17
Payer: COMMERCIAL

## 2025-03-17 VITALS — WEIGHT: 125 LBS | BODY MASS INDEX: 20.83 KG/M2 | HEIGHT: 65 IN

## 2025-03-17 DIAGNOSIS — Z12.31 SCREENING MAMMOGRAM FOR BREAST CANCER: ICD-10-CM

## 2025-03-17 PROCEDURE — 77067 SCR MAMMO BI INCL CAD: CPT | Performed by: STUDENT IN AN ORGANIZED HEALTH CARE EDUCATION/TRAINING PROGRAM

## 2025-03-17 PROCEDURE — 77063 BREAST TOMOSYNTHESIS BI: CPT

## 2025-03-17 PROCEDURE — 77063 BREAST TOMOSYNTHESIS BI: CPT | Performed by: STUDENT IN AN ORGANIZED HEALTH CARE EDUCATION/TRAINING PROGRAM

## 2025-03-24 DIAGNOSIS — R92.8 ABNORMALITY OF RIGHT BREAST ON SCREENING MAMMOGRAM: Primary | ICD-10-CM

## 2025-04-14 ENCOUNTER — HOSPITAL ENCOUNTER (OUTPATIENT)
Dept: RADIOLOGY | Facility: HOSPITAL | Age: 55
Discharge: HOME | End: 2025-04-14
Payer: COMMERCIAL

## 2025-04-14 DIAGNOSIS — R92.8 ABNORMALITY OF RIGHT BREAST ON SCREENING MAMMOGRAM: ICD-10-CM

## 2025-04-14 PROCEDURE — 77061 BREAST TOMOSYNTHESIS UNI: CPT | Mod: RT

## 2025-04-14 PROCEDURE — 77061 BREAST TOMOSYNTHESIS UNI: CPT | Mod: RIGHT SIDE | Performed by: RADIOLOGY

## 2025-04-14 PROCEDURE — 77065 DX MAMMO INCL CAD UNI: CPT | Mod: RIGHT SIDE | Performed by: RADIOLOGY

## 2025-06-16 ENCOUNTER — APPOINTMENT (OUTPATIENT)
Dept: PRIMARY CARE | Facility: CLINIC | Age: 55
End: 2025-06-16
Payer: COMMERCIAL

## 2025-06-16 VITALS
BODY MASS INDEX: 22.3 KG/M2 | DIASTOLIC BLOOD PRESSURE: 71 MMHG | SYSTOLIC BLOOD PRESSURE: 103 MMHG | OXYGEN SATURATION: 98 % | HEART RATE: 78 BPM | WEIGHT: 134 LBS

## 2025-06-16 DIAGNOSIS — E55.9 AVITAMINOSIS D: ICD-10-CM

## 2025-06-16 DIAGNOSIS — Z00.00 ROUTINE GENERAL MEDICAL EXAMINATION AT A HEALTH CARE FACILITY: ICD-10-CM

## 2025-06-16 DIAGNOSIS — J32.0 CHRONIC MAXILLARY SINUSITIS: Primary | ICD-10-CM

## 2025-06-16 DIAGNOSIS — L70.0 ACNE VULGARIS: ICD-10-CM

## 2025-06-16 DIAGNOSIS — B00.1 COLD SORE: ICD-10-CM

## 2025-06-16 PROBLEM — M35.9 DIFFUSE CONNECTIVE TISSUE DISEASE (MULTI): Status: RESOLVED | Noted: 2023-10-31 | Resolved: 2025-06-16

## 2025-06-16 PROBLEM — G95.89 CERVICAL SPINAL MASS: Status: RESOLVED | Noted: 2023-10-04 | Resolved: 2025-06-16

## 2025-06-16 PROCEDURE — 99214 OFFICE O/P EST MOD 30 MIN: CPT | Performed by: FAMILY MEDICINE

## 2025-06-16 PROCEDURE — 1036F TOBACCO NON-USER: CPT | Performed by: FAMILY MEDICINE

## 2025-06-16 RX ORDER — AZITHROMYCIN 250 MG/1
TABLET, FILM COATED ORAL
Qty: 6 TABLET | Refills: 0 | Status: SHIPPED | OUTPATIENT
Start: 2025-06-16 | End: 2025-06-21

## 2025-06-16 RX ORDER — TRETINOIN 1 MG/G
CREAM TOPICAL NIGHTLY
Qty: 45 G | Refills: 5 | Status: SHIPPED | OUTPATIENT
Start: 2025-06-16 | End: 2026-06-16

## 2025-06-16 RX ORDER — FLUTICASONE PROPIONATE 50 MCG
1 SPRAY, SUSPENSION (ML) NASAL DAILY
Qty: 16 G | Refills: 5 | Status: SHIPPED | OUTPATIENT
Start: 2025-06-16 | End: 2026-06-16

## 2025-06-16 RX ORDER — VALACYCLOVIR HYDROCHLORIDE 500 MG/1
500 TABLET, FILM COATED ORAL 2 TIMES DAILY
Qty: 6 TABLET | Refills: 0 | Status: SHIPPED | OUTPATIENT
Start: 2025-06-16 | End: 2025-06-19

## 2025-06-16 ASSESSMENT — PATIENT HEALTH QUESTIONNAIRE - PHQ9
2. FEELING DOWN, DEPRESSED OR HOPELESS: NOT AT ALL
1. LITTLE INTEREST OR PLEASURE IN DOING THINGS: NOT AT ALL
SUM OF ALL RESPONSES TO PHQ9 QUESTIONS 1 AND 2: 0

## 2025-06-16 NOTE — PATIENT INSTRUCTIONS
Labs due now     BP looks good today at 103/71     Refilled the valtrex    Refilled the tretinoin cream     Continue yearly screening mammograms.      colonoscopy was good on 2/27/23- repeat in 10 years or sooner as needed     Try the Neurtec as needed for the migraines , samples given     follow up in 12 months for yearly follow up or sooner as needed.

## 2025-06-16 NOTE — PROGRESS NOTES
"Subjective   Angeles Valdez is a 54 y.o. female who presents for Annual Exam (Physical, mammogram, retin-a, hormonal prescriptions to be taken over by Dr. Chan, Sinus infection possible or just allergies, sinus pressure, mucus yellow, ).    HPI     Sirolimus/egcg 0.2% topical nightly. With estogen and testosterone cream   Also oral estradiol1 mg daily     #) sinus issues - started over 1 month ago   - seasonal allergies   - ear fullness  - no fever   - no rash, no wheeze   - sporadic allergy pill  - no nasal spray     #) abnormal right mammo on 3/17/25, then needed right diagnostic,   - dig was good on the right and recommend return to 1 year follow up     #) Urethral bulking on on 12/12/25 with Dr fitzgerald   - estrace 1 mg     #) left hip pain with low back pain   - follows with Dr Espinosa , sports med     #)  migraines   - follows with neurology at [facility]   - tried imitrex and hated it   - never tried the neurtec , will samples today   - reports she is drinking enough water for the most part    #) yoko/ Postmenopausal   Tretinion 0.1% cream   - on estrace cream   - no hotflashes   - sleep is ok  - lower sex drive  - slight weight gain and brain fog     ROS was completed and all systems are negative with the exception of what was noted in the the HPI.     Objective     /71   Pulse 78   Wt 60.8 kg (134 lb)   SpO2 98%   BMI 22.30 kg/m²      Last Labs:     CMP:   Lab Results   Component Value Date    CALCIUM 9.4 07/13/2023    PROT 6.9 07/13/2023    ALBUMIN 4.3 07/13/2023    AST 30 07/13/2023    ALKPHOS 58 07/13/2023    BILITOT 0.7 07/13/2023     CBC:   Lab Results   Component Value Date    WBC 4.8 07/13/2023    HGB 12.6 07/13/2023    HCT 37.3 07/13/2023    MCV 90.8 07/13/2023     07/13/2023     A1C:   No results found for: \"HGBA1C\"  LIPID PANEL:   Lab Results   Component Value Date    CHOL 140 07/13/2023    TRIG 58 07/13/2023    HDL 58 07/13/2023    CHHDL 2.4 07/13/2023     TSH:   Lab Results   Component " "Value Date    TSH 1.57 07/13/2023     PSA:   No results found for: \"PSA\"    Physical Exam  GEN: A+O, no acute distress  HEENT: NC/AT, Oropharynx clear, no exudates, TM visualized, Extraoccular muscles intact, no facial droop; no thyromegaly or cervical LAD  RESP: CTAB, no wheezes   CV: RRR, no murmurs  ABD: soft, non-tender, + BS  SKIN: no rashes or bruising, no peripheral edema   NEURO: CN II-XII grossly intact, moves all extremities equally, no tremor   PSYCH: normal affect, appropriate mood     Assessment/Plan   Problem List Items Addressed This Visit    None    Labs due now     BP looks good today at 103/71     Refilled the valtrex    Refilled the tretinoin cream     Continue yearly screening mammograms.      colonoscopy was good on 2/27/23- repeat in 10 years or sooner as needed     Try the Neurtec as needed for the migraines , samples given     follow up in 12 months for yearly follow up or sooner as needed.        Yvonne Chan DO, [facility] [place] [place]  [address]   [address]   [place] [address]  Ph. [phone]  Fx. [phone]  "

## 2026-06-19 ENCOUNTER — APPOINTMENT (OUTPATIENT)
Dept: PRIMARY CARE | Facility: CLINIC | Age: 56
End: 2026-06-19
Payer: COMMERCIAL

## (undated) DEVICE — IRRIGATION SET, CYSTOSCOPY, TURP, Y, CONTINUOUS, 81 IN

## (undated) DEVICE — GLOVE, SURGEON, PREMIERPRO PI, MICRO, SZ-7.5, PF, WH

## (undated) DEVICE — Device